# Patient Record
Sex: MALE | Race: WHITE | NOT HISPANIC OR LATINO | Employment: OTHER | URBAN - METROPOLITAN AREA
[De-identification: names, ages, dates, MRNs, and addresses within clinical notes are randomized per-mention and may not be internally consistent; named-entity substitution may affect disease eponyms.]

---

## 2021-04-06 ENCOUNTER — APPOINTMENT (EMERGENCY)
Dept: CT IMAGING | Facility: HOSPITAL | Age: 61
End: 2021-04-06
Payer: COMMERCIAL

## 2021-04-06 ENCOUNTER — HOSPITAL ENCOUNTER (EMERGENCY)
Facility: HOSPITAL | Age: 61
Discharge: HOME | End: 2021-04-06
Attending: EMERGENCY MEDICINE | Admitting: EMERGENCY MEDICINE
Payer: COMMERCIAL

## 2021-04-06 VITALS
HEIGHT: 71 IN | DIASTOLIC BLOOD PRESSURE: 53 MMHG | TEMPERATURE: 97.4 F | WEIGHT: 150 LBS | HEART RATE: 104 BPM | SYSTOLIC BLOOD PRESSURE: 93 MMHG | OXYGEN SATURATION: 99 % | BODY MASS INDEX: 21 KG/M2 | RESPIRATION RATE: 16 BRPM

## 2021-04-06 DIAGNOSIS — R55 SYNCOPE: Primary | ICD-10-CM

## 2021-04-06 LAB
ALBUMIN SERPL BCP-MCNC: 4 G/DL (ref 3–5.2)
ALP SERPL-CCNC: 123 U/L (ref 43–122)
ALT SERPL W P-5'-P-CCNC: 5 U/L
ANION GAP SERPL CALCULATED.3IONS-SCNC: 17 MMOL/L (ref 5–14)
ANISOCYTOSIS BLD QL SMEAR: PRESENT
AST SERPL W P-5'-P-CCNC: 18 U/L (ref 17–59)
ATRIAL RATE: 106 BPM
BASOPHILS # BLD AUTO: 0.1 THOUSANDS/ΜL (ref 0–0.1)
BASOPHILS NFR BLD AUTO: 1 % (ref 0–1)
BILIRUB SERPL-MCNC: 0.8 MG/DL
BUN SERPL-MCNC: 13 MG/DL (ref 5–25)
CALCIUM SERPL-MCNC: 9.3 MG/DL (ref 8.4–10.2)
CHLORIDE SERPL-SCNC: 92 MMOL/L (ref 97–108)
CK SERPL-CCNC: 25 U/L (ref 55–170)
CO2 SERPL-SCNC: 23 MMOL/L (ref 22–30)
CREAT SERPL-MCNC: 0.73 MG/DL (ref 0.7–1.5)
D DIMER PPP FEU-MCNC: 0.93 UG/ML FEU
EOSINOPHIL # BLD AUTO: 0.2 THOUSAND/ΜL (ref 0–0.4)
EOSINOPHIL NFR BLD AUTO: 2 % (ref 0–6)
ERYTHROCYTE [DISTWIDTH] IN BLOOD BY AUTOMATED COUNT: 18.5 %
GFR SERPL CREATININE-BSD FRML MDRD: 101 ML/MIN/1.73SQ M
GLUCOSE SERPL-MCNC: 148 MG/DL (ref 70–99)
HCT VFR BLD AUTO: 35.8 % (ref 41–53)
HGB BLD-MCNC: 10.5 G/DL (ref 13.5–17.5)
LIPASE SERPL-CCNC: 25 U/L (ref 23–300)
LYMPHOCYTES # BLD AUTO: 1 THOUSANDS/ΜL (ref 0.5–4)
LYMPHOCYTES NFR BLD AUTO: 12 % (ref 25–45)
MCH RBC QN AUTO: 19.2 PG (ref 26–34)
MCHC RBC AUTO-ENTMCNC: 29.4 G/DL (ref 31–36)
MCV RBC AUTO: 65 FL (ref 80–100)
MICROCYTES BLD QL AUTO: PRESENT
MONOCYTES # BLD AUTO: 0.5 THOUSAND/ΜL (ref 0.2–0.9)
MONOCYTES NFR BLD AUTO: 6 % (ref 1–10)
NEUTROPHILS # BLD AUTO: 6.2 THOUSANDS/ΜL (ref 1.8–7.8)
NEUTS SEG NFR BLD AUTO: 78 % (ref 45–65)
NT-PROBNP SERPL-MCNC: 66.6 PG/ML (ref 0–299)
PLATELET # BLD AUTO: 312 THOUSANDS/UL (ref 150–450)
PLATELET BLD QL SMEAR: ADEQUATE
PMV BLD AUTO: 9.3 FL (ref 8.9–12.7)
POIKILOCYTOSIS BLD QL SMEAR: PRESENT
POTASSIUM SERPL-SCNC: 4.1 MMOL/L (ref 3.6–5)
PR INTERVAL: 134 MS
PROT SERPL-MCNC: 7.9 G/DL (ref 5.9–8.4)
QRS AXIS: 148 DEGREES
QRSD INTERVAL: 86 MS
QT INTERVAL: 350 MS
QTC INTERVAL: 464 MS
RBC # BLD AUTO: 5.48 MILLION/UL (ref 4.5–5.9)
RBC MORPH BLD: NORMAL
SODIUM SERPL-SCNC: 132 MMOL/L (ref 137–147)
T WAVE AXIS: 150 DEGREES
TROPONIN I SERPL-MCNC: <0.01 NG/ML (ref 0–0.03)
VENTRICULAR RATE: 106 BPM
WBC # BLD AUTO: 7.9 THOUSAND/UL (ref 4.5–11)

## 2021-04-06 PROCEDURE — 82550 ASSAY OF CK (CPK): CPT | Performed by: PHYSICIAN ASSISTANT

## 2021-04-06 PROCEDURE — 96374 THER/PROPH/DIAG INJ IV PUSH: CPT

## 2021-04-06 PROCEDURE — 36415 COLL VENOUS BLD VENIPUNCTURE: CPT | Performed by: PHYSICIAN ASSISTANT

## 2021-04-06 PROCEDURE — 83880 ASSAY OF NATRIURETIC PEPTIDE: CPT | Performed by: PHYSICIAN ASSISTANT

## 2021-04-06 PROCEDURE — 99284 EMERGENCY DEPT VISIT MOD MDM: CPT

## 2021-04-06 PROCEDURE — 93005 ELECTROCARDIOGRAM TRACING: CPT

## 2021-04-06 PROCEDURE — 80053 COMPREHEN METABOLIC PANEL: CPT | Performed by: PHYSICIAN ASSISTANT

## 2021-04-06 PROCEDURE — 85025 COMPLETE CBC W/AUTO DIFF WBC: CPT | Performed by: PHYSICIAN ASSISTANT

## 2021-04-06 PROCEDURE — 96361 HYDRATE IV INFUSION ADD-ON: CPT

## 2021-04-06 PROCEDURE — 93010 ELECTROCARDIOGRAM REPORT: CPT | Performed by: INTERNAL MEDICINE

## 2021-04-06 PROCEDURE — 83690 ASSAY OF LIPASE: CPT | Performed by: PHYSICIAN ASSISTANT

## 2021-04-06 PROCEDURE — 84484 ASSAY OF TROPONIN QUANT: CPT | Performed by: PHYSICIAN ASSISTANT

## 2021-04-06 PROCEDURE — 70450 CT HEAD/BRAIN W/O DYE: CPT

## 2021-04-06 PROCEDURE — 85379 FIBRIN DEGRADATION QUANT: CPT | Performed by: PHYSICIAN ASSISTANT

## 2021-04-06 PROCEDURE — 71275 CT ANGIOGRAPHY CHEST: CPT

## 2021-04-06 PROCEDURE — 99284 EMERGENCY DEPT VISIT MOD MDM: CPT | Performed by: PHYSICIAN ASSISTANT

## 2021-04-06 RX ORDER — ONDANSETRON 2 MG/ML
4 INJECTION INTRAMUSCULAR; INTRAVENOUS ONCE
Status: COMPLETED | OUTPATIENT
Start: 2021-04-06 | End: 2021-04-06

## 2021-04-06 RX ADMIN — ONDANSETRON 4 MG: 2 INJECTION INTRAMUSCULAR; INTRAVENOUS at 18:35

## 2021-04-06 RX ADMIN — IOHEXOL 100 ML: 350 INJECTION, SOLUTION INTRAVENOUS at 19:01

## 2021-04-06 RX ADMIN — SODIUM CHLORIDE 1000 ML: 0.9 INJECTION, SOLUTION INTRAVENOUS at 18:37

## 2021-04-06 NOTE — ED PROVIDER NOTES
History  Chief Complaint   Patient presents with   Luisito Baumann when getting up to answer the door at home  Reports some dizziness prior to fall     Patient is a 45-year-old male presents today for evaluation of a syncopal episode  Patient was at home alone and reports he got up to answer the doorbell for his neighbor who was coming to check on him and reports he lost consciousness and fell striking his head  Patient denies any preceding symptoms reports symptoms currently denies any recent illness, changes to medications  Patient denies any chest pain, shortness of breath, palpitations prior to or after the episode  History provided by:  Patient   used: No    Syncope  Episode history:  Single  Most recent episode: Today  Duration:  15 seconds (witnessed)  Timing:  Constant  Progression:  Resolved  Chronicity:  New  Context: standing up ( quickly getting up to answer the door)    Witnessed: yes    Relieved by: spontaneous  Worsened by:  Nothing  Ineffective treatments:  None tried  Associated symptoms: no chest pain, no dizziness, no fever, no nausea, no palpitations, no shortness of breath and no vomiting        None       Past Medical History:   Diagnosis Date    Diabetes mellitus ([facility] [address] )        History reviewed  No pertinent surgical history  History reviewed  No pertinent family history  I have reviewed and agree with the history as documented  E-Cigarette/Vaping     E-Cigarette/Vaping Substances     Social History     Tobacco Use    Smoking status: Never Smoker    Smokeless tobacco: Never Used   Substance Use Topics    Alcohol use: Not Currently    Drug use: Never       Review of Systems   Constitutional: Negative for chills, fatigue and fever  HENT: Negative for congestion, ear pain, rhinorrhea and sore throat  Eyes: Negative for redness  Respiratory: Negative for chest tightness and shortness of breath  Cardiovascular: Positive for syncope   Negative for chest pain and palpitations  Gastrointestinal: Negative for abdominal pain, nausea and vomiting  Genitourinary: Negative for dysuria and hematuria  Musculoskeletal: Negative  Skin: Positive for wound ( b/l lower leg wounds)  Negative for rash  Neurological: Positive for syncope  Negative for dizziness, light-headedness and numbness  Physical Exam  Physical Exam  Vitals signs and nursing note reviewed  Constitutional:       Appearance: Normal appearance  He is well-developed  HENT:      Head: Normocephalic and atraumatic  Eyes:      General: No scleral icterus  Pupils: Pupils are equal, round, and reactive to light  Neck:      Musculoskeletal: Normal range of motion  Cardiovascular:      Rate and Rhythm: Normal rate and regular rhythm  Pulses: Normal pulses  Pulmonary:      Effort: Pulmonary effort is normal  No respiratory distress  Breath sounds: No stridor  Abdominal:      General: There is no distension  Palpations: There is no mass  Musculoskeletal:        Back:         Legs:    Skin:     General: Skin is warm and dry  Capillary Refill: Capillary refill takes less than 2 seconds  Coloration: Skin is pale  Skin is not jaundiced  Neurological:      General: No focal deficit present  Mental Status: He is alert and oriented to person, place, and time  Gait: Gait normal       Comments: GCS 15  AAOx4  No focal neuro deficits  CN II-XII intact  PERRL  EOMI  No pronator drift   strength 5/5 bilaterally  B/L UE strength 5/5 throughout  Finger to nose, heel shin, rapid alternating movements Cerebellar function normal  Ambulates without difficulty  B/L LE strength 5/5 throughout   Gross sensation to b/l upper and lower extremities intact        Psychiatric:         Mood and Affect: Mood normal          Vital Signs  ED Triage Vitals [04/06/21 1800]   Temperature Pulse Respirations Blood Pressure SpO2   (!) 97 4 °F (36 3 °C) 104 16 93/53 99 % Temp Source Heart Rate Source Patient Position - Orthostatic VS BP Location FiO2 (%)   Tympanic Monitor Sitting Left arm --      Pain Score       --           Vitals:    04/06/21 1800   BP: 93/53   Pulse: 104   Patient Position - Orthostatic VS: Sitting         Visual Acuity      ED Medications  Medications   sodium chloride 0 9 % bolus 1,000 mL (0 mL Intravenous Stopped 4/6/21 2017)   ondansetron (ZOFRAN) injection 4 mg (4 mg Intravenous Given 4/6/21 1835)   iohexol (OMNIPAQUE) 350 MG/ML injection (SINGLE-DOSE) 100 mL (100 mL Intravenous Given 4/6/21 1901)       Diagnostic Studies  Results Reviewed     Procedure Component Value Units Date/Time    Smear Review(Phlebs Do Not Order) [[id number]] Collected: 04/06/21 1829    Lab Status: Final result Specimen: Blood from Arm, Left Updated: 04/06/21 1910     RBC Morphology abnormal     Anisocytosis Present     Microcytes Present     Poikilocytes Present     Platelet Estimate Adequate    NT-BNP PRO [[id number]]  (Normal) Collected: 04/06/21 1829    Lab Status: Final result Specimen: Blood from Arm, Right Updated: 04/06/21 1858     NT-proBNP 66 6 pg/mL     Troponin I [[id number]]  (Normal) Collected: 04/06/21 1833    Lab Status: Final result Specimen: Blood from Arm, Left Updated: 04/06/21 1858     Troponin I <0 01 ng/mL     D-Dimer [[id number]]  (Abnormal) Collected: 04/06/21 1829    Lab Status: Final result Specimen: Blood from Arm, Right Updated: 04/06/21 1850     D-Dimer, Quant 0 93 ug/ml FEU     Lipase [[id number]]  (Normal) Collected: 04/06/21 1829    Lab Status: Final result Specimen: Blood from Arm, Right Updated: 04/06/21 1847     Lipase 25 u/L     CK Total with Reflex CKMB [[id number]]  (Abnormal) Collected: 04/06/21 1829    Lab Status: Final result Specimen: Blood from Arm, Right Updated: 04/06/21 1847     Total CK 25 U/L     Comprehensive metabolic panel [[id number]]  (Abnormal) Collected: 04/06/21 1829    Lab Status: Final result Specimen: Blood from Arm, Right Updated: 04/06/21 1847     Sodium 132 mmol/L      Potassium 4 1 mmol/L      Chloride 92 mmol/L      CO2 23 mmol/L      ANION GAP 17 mmol/L      BUN 13 mg/dL      Creatinine 0 73 mg/dL      Glucose 148 mg/dL      Calcium 9 3 mg/dL      AST 18 U/L      ALT 5 U/L      Alkaline Phosphatase 123 U/L      Total Protein 7 9 g/dL      Albumin 4 0 g/dL      Total Bilirubin 0 80 mg/dL      eGFR 101 ml/min/1 73sq m     Narrative:      National Kidney Disease Foundation guidelines for Chronic Kidney Disease (CKD):     Stage 1 with normal or high GFR (GFR > 90 mL/min/1 73 square meters)    Stage 2 Mild CKD (GFR = 60-89 mL/min/1 73 square meters)    Stage 3A Moderate CKD (GFR = 45-59 mL/min/1 73 square meters)    Stage 3B Moderate CKD (GFR = 30-44 mL/min/1 73 square meters)    Stage 4 Severe CKD (GFR = 15-29 mL/min/1 73 square meters)    Stage 5 End Stage CKD (GFR <15 mL/min/1 73 square meters)  Note: GFR calculation is accurate only with a steady state creatinine    CBC and differential [[id number]]  (Abnormal) Collected: 04/06/21 1829    Lab Status: Final result Specimen: Blood from Arm, Left Updated: 04/06/21 1844     WBC 7 90 Thousand/uL      RBC 5 48 Million/uL      Hemoglobin 10 5 g/dL      Hematocrit 35 8 %      MCV 65 fL      MCH 19 2 pg      MCHC 29 4 g/dL      RDW 18 5 %      MPV 9 3 fL      Platelets 747 Thousands/uL      Neutrophils Relative 78 %      Lymphocytes Relative 12 %      Monocytes Relative 6 %      Eosinophils Relative 2 %      Basophils Relative 1 %      Neutrophils Absolute 6 20 Thousands/µL      Lymphocytes Absolute 1 00 Thousands/µL      Monocytes Absolute 0 50 Thousand/µL      Eosinophils Absolute 0 20 Thousand/µL      Basophils Absolute 0 10 Thousands/µL     UA (URINE) with reflex to Scope [[id number]]     Lab Status: No result Specimen: Urine                  CTA ED chest PE Study   Final Result by Binh Miranda MD (04/06 1919)      1  No pulmonary embolus        Measured RV/LV ratio is within normal limits at less than 0 9  2 Less than 3 mm nodules on the right  Based on current Fleischner Society 2017 Guidelines on incidental pulmonary nodule, no routine follow-up is needed if the patient is considered low risk for lung cancer  If the patient is considered high risk for    lung cancer, 12 month follow-up non-contrast chest CT is recommended  Workstation performed: [id number]         CT head without contrast   Final Result by Carla Meza MD (04/06 1907)      No acute intracranial abnormality  Workstation performed: [id number]                    Procedures  ECG 12 Lead Documentation Only    Date/Time: 4/6/2021 6:19 PM  Performed by: Felicia Pan PA-C  Authorized by: Felicia Pan PA-C     Indications / Diagnosis:  Syncope  Patient location:  ED  Rate:     ECG rate:  106    ECG rate assessment: tachycardic    Rhythm:     Rhythm: sinus tachycardia    Ectopy:     Ectopy: none    QRS:     QRS axis:  Normal    QRS intervals:  Normal  Conduction:     Conduction: normal    ST segments:     ST segments:  Normal  T waves:     T waves: normal    Q waves:     Q waves:  V1 and V2  Comments:      JONATHAN 583             ED Course  ED Course as of Apr 06 2019   Tue Apr 06, 2021   1940    1  No pulmonary embolus      Measured RV/LV ratio is within normal limits at less than 0 9        2 Less than 3 mm nodules on the right  Based on current Fleischner Society 2017 Guidelines on incidental pulmonary nodule, no routine follow-up is needed if the patient is considered low risk for lung cancer  If the patient is considered high risk for   lung cancer, 12 month follow-up non-contrast chest CT is recommended  1944 Patient was reexamined at this time and informed of laboratory and/or imaging results and was found to be stable for discharge    Return to emergency department criteria was reviewed with the patient who verbalized understanding and was agreeable to discharge and the treatment plan at this time  SBIRT 20yo+      Most Recent Value   SBIRT (24 yo +)   In order to provide better care to our patients, we are screening all of our patients for alcohol and drug use  Would it be okay to ask you these screening questions? Unable to answer at this time Filed at: 04/06/2021 1817                    [facility]  Number of Diagnoses or Management Options  Syncope:   Diagnosis management comments: All imaging and/or lab testing discussed with patient, strict return to ED precautions discussed  Patient recommended to follow up promptly with appropriate outpatient provider  Patient and/or family members verbalizes understanding and agrees with plan  Patient is stable for discharge      Portions of the record may have been created with voice recognition software  Occasional wrong word or "sound a like" substitutions may have occurred due to the inherent limitations of voice recognition software  Read the chart carefully and recognize, using context, where substitutions have occurred  Disposition  Final diagnoses:   Syncope     Time reflects when diagnosis was documented in both MDM as applicable and the Disposition within this note     Time User Action Codes Description Comment    4/6/2021  7:58 PM Orin Alvarez Keli [R55] Syncope       ED Disposition     ED Disposition Condition Date/Time Comment    Discharge Good linda Apr 6, 2021  7:57 PM Alexei Godfrey discharge to home/self care  Follow-up Information     Follow up With Specialties Details Why Contact Info    Maggie Morgan MD Family Medicine Schedule an appointment as soon as possible for a visit in 2 days  [address]  [phone]            Patient's Medications    No medications on file     No discharge procedures on file      PDMP Review     None          ED Provider  Electronically Signed by           Daisha Potter PA-C  04/06/21 2019

## 2021-04-28 ENCOUNTER — TRANSCRIBE ORDERS (OUTPATIENT)
Dept: ADMINISTRATIVE | Facility: HOSPITAL | Age: 61
End: 2021-04-28

## 2021-04-28 ENCOUNTER — LAB (OUTPATIENT)
Dept: LAB | Facility: HOSPITAL | Age: 61
End: 2021-04-28
Payer: COMMERCIAL

## 2021-04-28 DIAGNOSIS — Z12.5 SPECIAL SCREENING FOR MALIGNANT NEOPLASM OF PROSTATE: ICD-10-CM

## 2021-04-28 DIAGNOSIS — R53.83 FATIGUE, UNSPECIFIED TYPE: ICD-10-CM

## 2021-04-28 DIAGNOSIS — Z00.00 ROUTINE ADULT HEALTH MAINTENANCE: ICD-10-CM

## 2021-04-28 DIAGNOSIS — R73.9 HYPERGLYCEMIA: ICD-10-CM

## 2021-04-28 DIAGNOSIS — R00.1 DECREASED HEART RATE: Primary | ICD-10-CM

## 2021-04-28 DIAGNOSIS — D64.9 ANEMIA, UNSPECIFIED TYPE: ICD-10-CM

## 2021-04-28 LAB
25(OH)D3 SERPL-MCNC: 11.3 NG/ML (ref 30–100)
ANISOCYTOSIS BLD QL SMEAR: PRESENT
BASOPHILS # BLD AUTO: 0.1 THOUSANDS/ΜL (ref 0–0.1)
BASOPHILS NFR BLD AUTO: 1 % (ref 0–1)
CHOLEST SERPL-MCNC: 196 MG/DL
EOSINOPHIL # BLD AUTO: 0.2 THOUSAND/ΜL (ref 0–0.4)
EOSINOPHIL NFR BLD AUTO: 2 % (ref 0–6)
ERYTHROCYTE [DISTWIDTH] IN BLOOD BY AUTOMATED COUNT: 20.3 %
EST. AVERAGE GLUCOSE BLD GHB EST-MCNC: 321 MG/DL
FERRITIN SERPL-MCNC: 9 NG/ML (ref 8–388)
FOLATE SERPL-MCNC: 7.7 NG/ML (ref 3.1–17.5)
HBA1C MFR BLD: 12.8 %
HCT VFR BLD AUTO: 33 % (ref 41–53)
HDLC SERPL-MCNC: 54 MG/DL
HGB BLD-MCNC: 10 G/DL (ref 13.5–17.5)
HYPERCHROMIA BLD QL SMEAR: PRESENT
IRON SATN MFR SERPL: 4 %
IRON SERPL-MCNC: 19 UG/DL (ref 65–175)
LDLC SERPL CALC-MCNC: 113 MG/DL
LYMPHOCYTES # BLD AUTO: 1.4 THOUSANDS/ΜL (ref 0.5–4)
LYMPHOCYTES NFR BLD AUTO: 15 % (ref 25–45)
MCH RBC QN AUTO: 20.2 PG (ref 26–34)
MCHC RBC AUTO-ENTMCNC: 30.3 G/DL (ref 31–36)
MCV RBC AUTO: 67 FL (ref 80–100)
MICROCYTES BLD QL AUTO: PRESENT
MONOCYTES # BLD AUTO: 0.5 THOUSAND/ΜL (ref 0.2–0.9)
MONOCYTES NFR BLD AUTO: 5 % (ref 1–10)
NEUTROPHILS # BLD AUTO: 7.1 THOUSANDS/ΜL (ref 1.8–7.8)
NEUTS SEG NFR BLD AUTO: 77 % (ref 45–65)
NONHDLC SERPL-MCNC: 142 MG/DL
PLATELET # BLD AUTO: 289 THOUSANDS/UL (ref 150–450)
PLATELET BLD QL SMEAR: ADEQUATE
PMV BLD AUTO: 9.1 FL (ref 8.9–12.7)
POIKILOCYTOSIS BLD QL SMEAR: PRESENT
PSA SERPL-MCNC: 0.2 NG/ML (ref 0–4)
RBC # BLD AUTO: 4.95 MILLION/UL (ref 4.5–5.9)
RBC MORPH BLD: NORMAL
TIBC SERPL-MCNC: 438 UG/DL (ref 250–450)
TRIGL SERPL-MCNC: 143 MG/DL
TSH SERPL DL<=0.05 MIU/L-ACNC: 1.08 UIU/ML (ref 0.47–4.68)
VIT B12 SERPL-MCNC: 574 PG/ML (ref 100–900)
WBC # BLD AUTO: 9.2 THOUSAND/UL (ref 4.5–11)

## 2021-04-28 PROCEDURE — 84443 ASSAY THYROID STIM HORMONE: CPT

## 2021-04-28 PROCEDURE — 85025 COMPLETE CBC W/AUTO DIFF WBC: CPT

## 2021-04-28 PROCEDURE — 82306 VITAMIN D 25 HYDROXY: CPT

## 2021-04-28 PROCEDURE — 83550 IRON BINDING TEST: CPT

## 2021-04-28 PROCEDURE — 82607 VITAMIN B-12: CPT

## 2021-04-28 PROCEDURE — 82728 ASSAY OF FERRITIN: CPT

## 2021-04-28 PROCEDURE — 82746 ASSAY OF FOLIC ACID SERUM: CPT

## 2021-04-28 PROCEDURE — G0103 PSA SCREENING: HCPCS

## 2021-04-28 PROCEDURE — 80061 LIPID PANEL: CPT

## 2021-04-28 PROCEDURE — 83036 HEMOGLOBIN GLYCOSYLATED A1C: CPT

## 2021-04-28 PROCEDURE — 36415 COLL VENOUS BLD VENIPUNCTURE: CPT

## 2021-04-28 PROCEDURE — 83540 ASSAY OF IRON: CPT

## 2021-05-03 ENCOUNTER — LAB (OUTPATIENT)
Dept: LAB | Facility: HOSPITAL | Age: 61
End: 2021-05-03
Payer: COMMERCIAL

## 2021-05-03 ENCOUNTER — HOSPITAL ENCOUNTER (OUTPATIENT)
Dept: NON INVASIVE DIAGNOSTICS | Facility: HOSPITAL | Age: 61
Discharge: HOME | End: 2021-05-03
Payer: COMMERCIAL

## 2021-05-03 DIAGNOSIS — Z12.11 SPECIAL SCREENING FOR MALIGNANT NEOPLASMS, COLON: ICD-10-CM

## 2021-05-03 DIAGNOSIS — R00.1 DECREASED HEART RATE: ICD-10-CM

## 2021-05-03 LAB
HEMOCCULT STL QL: NEGATIVE

## 2021-05-03 PROCEDURE — 93226 XTRNL ECG REC<48 HR SCAN A/R: CPT

## 2021-05-03 PROCEDURE — 93225 XTRNL ECG REC<48 HRS REC: CPT

## 2021-05-03 PROCEDURE — 82272 OCCULT BLD FECES 1-3 TESTS: CPT

## 2021-05-04 NOTE — RESULT ENCOUNTER NOTE
Please call the patient regarding his abnormal result  anemic, ferrous sulfate 325 # 180 twice  a day and colonoscopy  Sugar very high refer to endocrinologist  vit d low, vit d 45253 unit once a week #12 and 2 ref

## 2021-05-06 PROCEDURE — 93227 XTRNL ECG REC<48 HR R&I: CPT | Performed by: INTERNAL MEDICINE

## 2021-08-05 ENCOUNTER — APPOINTMENT (OUTPATIENT)
Dept: LAB | Facility: HOSPITAL | Age: 61
End: 2021-08-05
Payer: COMMERCIAL

## 2021-08-05 DIAGNOSIS — D64.9 ANEMIA, UNSPECIFIED TYPE: ICD-10-CM

## 2021-08-05 DIAGNOSIS — R19.7 DIARRHEA, UNSPECIFIED TYPE: ICD-10-CM

## 2021-08-05 PROCEDURE — 87177 OVA AND PARASITES SMEARS: CPT

## 2021-08-05 PROCEDURE — 87505 NFCT AGENT DETECTION GI: CPT

## 2021-08-05 PROCEDURE — 87205 SMEAR GRAM STAIN: CPT

## 2021-08-05 PROCEDURE — 87209 SMEAR COMPLEX STAIN: CPT

## 2021-08-06 LAB
CAMPYLOBACTER DNA SPEC NAA+PROBE: NORMAL
SALMONELLA DNA SPEC QL NAA+PROBE: NORMAL
SHIGA TOXIN STX GENE SPEC NAA+PROBE: NORMAL
SHIGELLA DNA SPEC QL NAA+PROBE: NORMAL
WBC STL QL MICRO: NORMAL

## 2021-08-11 ENCOUNTER — APPOINTMENT (OUTPATIENT)
Dept: LAB | Facility: HOSPITAL | Age: 61
End: 2021-08-11
Payer: COMMERCIAL

## 2021-08-11 ENCOUNTER — LAB (OUTPATIENT)
Dept: LAB | Facility: HOSPITAL | Age: 61
End: 2021-08-11
Payer: COMMERCIAL

## 2021-08-11 ENCOUNTER — OFFICE VISIT (OUTPATIENT)
Dept: WOUND CARE | Facility: HOSPITAL | Age: 61
End: 2021-08-11
Payer: COMMERCIAL

## 2021-08-11 VITALS
TEMPERATURE: 96.1 F | RESPIRATION RATE: 12 BRPM | DIASTOLIC BLOOD PRESSURE: 68 MMHG | HEART RATE: 84 BPM | SYSTOLIC BLOOD PRESSURE: 108 MMHG

## 2021-08-11 DIAGNOSIS — S21.201A OPEN WOUND OF RIGHT SIDE OF BACK, INITIAL ENCOUNTER: ICD-10-CM

## 2021-08-11 DIAGNOSIS — S81.801A OPEN WOUND OF RIGHT LOWER EXTREMITY WITHOUT COMPLICATION, INITIAL ENCOUNTER: Primary | ICD-10-CM

## 2021-08-11 DIAGNOSIS — S01.00XA OPEN WOUND OF SCALP, UNSPECIFIED OPEN WOUND TYPE, INITIAL ENCOUNTER: ICD-10-CM

## 2021-08-11 DIAGNOSIS — S81.001A OPEN WOUND OF RIGHT KNEE, INITIAL ENCOUNTER: ICD-10-CM

## 2021-08-11 DIAGNOSIS — R63.4 WEIGHT LOSS, UNINTENTIONAL: ICD-10-CM

## 2021-08-11 DIAGNOSIS — D64.9 ANEMIA, UNSPECIFIED TYPE: ICD-10-CM

## 2021-08-11 DIAGNOSIS — E11.69 TYPE 2 DIABETES MELLITUS WITH OTHER SPECIFIED COMPLICATION, WITHOUT LONG-TERM CURRENT USE OF INSULIN (HCC): ICD-10-CM

## 2021-08-11 DIAGNOSIS — E11.9 CONTROLLED TYPE 2 DIABETES MELLITUS WITHOUT COMPLICATION, WITHOUT LONG-TERM CURRENT USE OF INSULIN (HCC): ICD-10-CM

## 2021-08-11 LAB
ALBUMIN SERPL BCP-MCNC: 3.5 G/DL (ref 3.5–5)
ALP SERPL-CCNC: 121 U/L (ref 46–116)
ALT SERPL W P-5'-P-CCNC: 21 U/L (ref 12–78)
ANION GAP SERPL CALCULATED.3IONS-SCNC: 9 MMOL/L (ref 4–13)
AST SERPL W P-5'-P-CCNC: 14 U/L (ref 5–45)
BASOPHILS # BLD AUTO: 0.11 THOUSANDS/ΜL (ref 0–0.1)
BASOPHILS NFR BLD AUTO: 1 % (ref 0–1)
BILIRUB SERPL-MCNC: 0.31 MG/DL (ref 0.2–1)
BUN SERPL-MCNC: 13 MG/DL (ref 5–25)
C DIFF TOX B TCDB STL QL NAA+PROBE: NEGATIVE
CALCIUM SERPL-MCNC: 9 MG/DL (ref 8.3–10.1)
CHLORIDE SERPL-SCNC: 97 MMOL/L (ref 100–108)
CHOLEST SERPL-MCNC: 176 MG/DL (ref 50–200)
CO2 SERPL-SCNC: 28 MMOL/L (ref 21–32)
CREAT SERPL-MCNC: 0.63 MG/DL (ref 0.6–1.3)
EOSINOPHIL # BLD AUTO: 0.23 THOUSAND/ΜL (ref 0–0.61)
EOSINOPHIL NFR BLD AUTO: 2 % (ref 0–6)
ERYTHROCYTE [DISTWIDTH] IN BLOOD BY AUTOMATED COUNT: 16.9 % (ref 11.6–15.1)
EST. AVERAGE GLUCOSE BLD GHB EST-MCNC: 301 MG/DL
FERRITIN SERPL-MCNC: 5 NG/ML (ref 8–388)
FOLATE SERPL-MCNC: 15.1 NG/ML (ref 3.1–17.5)
GFR SERPL CREATININE-BSD FRML MDRD: 107 ML/MIN/1.73SQ M
GLUCOSE P FAST SERPL-MCNC: 381 MG/DL (ref 65–99)
HBA1C MFR BLD: 12.1 %
HCT VFR BLD AUTO: 35.5 % (ref 36.5–49.3)
HDLC SERPL-MCNC: 68 MG/DL
HEMOCCULT STL QL: NEGATIVE
HGB BLD-MCNC: 10.2 G/DL (ref 12–17)
IMM GRANULOCYTES # BLD AUTO: 0.03 THOUSAND/UL (ref 0–0.2)
IMM GRANULOCYTES NFR BLD AUTO: 0 % (ref 0–2)
IRON SATN MFR SERPL: 4 %
IRON SERPL-MCNC: 18 UG/DL (ref 65–175)
LDLC SERPL CALC-MCNC: 95 MG/DL (ref 0–100)
LYMPHOCYTES # BLD AUTO: 1.74 THOUSANDS/ΜL (ref 0.6–4.47)
LYMPHOCYTES NFR BLD AUTO: 18 % (ref 14–44)
MCH RBC QN AUTO: 19.8 PG (ref 26.8–34.3)
MCHC RBC AUTO-ENTMCNC: 28.7 G/DL (ref 31.4–37.4)
MCV RBC AUTO: 69 FL (ref 82–98)
MONOCYTES # BLD AUTO: 0.66 THOUSAND/ΜL (ref 0.17–1.22)
MONOCYTES NFR BLD AUTO: 7 % (ref 4–12)
NEUTROPHILS # BLD AUTO: 7 THOUSANDS/ΜL (ref 1.85–7.62)
NEUTS SEG NFR BLD AUTO: 72 % (ref 43–75)
NONHDLC SERPL-MCNC: 108 MG/DL
NRBC BLD AUTO-RTO: 0 /100 WBCS
O+P STL CONC: NORMAL
PLATELET # BLD AUTO: 331 THOUSANDS/UL (ref 149–390)
PMV BLD AUTO: 10.7 FL (ref 8.9–12.7)
POTASSIUM SERPL-SCNC: 4.3 MMOL/L (ref 3.5–5.3)
PROT SERPL-MCNC: 7.6 G/DL (ref 6.4–8.2)
RBC # BLD AUTO: 5.16 MILLION/UL (ref 3.88–5.62)
SODIUM SERPL-SCNC: 134 MMOL/L (ref 136–145)
TIBC SERPL-MCNC: 478 UG/DL (ref 250–450)
TRIGL SERPL-MCNC: 65 MG/DL
VIT B12 SERPL-MCNC: 620 PG/ML (ref 100–900)
WBC # BLD AUTO: 9.77 THOUSAND/UL (ref 4.31–10.16)

## 2021-08-11 PROCEDURE — 85025 COMPLETE CBC W/AUTO DIFF WBC: CPT

## 2021-08-11 PROCEDURE — 87493 C DIFF AMPLIFIED PROBE: CPT

## 2021-08-11 PROCEDURE — 80053 COMPREHEN METABOLIC PANEL: CPT

## 2021-08-11 PROCEDURE — 80061 LIPID PANEL: CPT

## 2021-08-11 PROCEDURE — 83036 HEMOGLOBIN GLYCOSYLATED A1C: CPT

## 2021-08-11 PROCEDURE — 82607 VITAMIN B-12: CPT

## 2021-08-11 PROCEDURE — 99214 OFFICE O/P EST MOD 30 MIN: CPT | Performed by: FAMILY MEDICINE

## 2021-08-11 PROCEDURE — 83550 IRON BINDING TEST: CPT

## 2021-08-11 PROCEDURE — 82746 ASSAY OF FOLIC ACID SERUM: CPT

## 2021-08-11 PROCEDURE — 83540 ASSAY OF IRON: CPT

## 2021-08-11 PROCEDURE — 99203 OFFICE O/P NEW LOW 30 MIN: CPT | Performed by: FAMILY MEDICINE

## 2021-08-11 PROCEDURE — 36415 COLL VENOUS BLD VENIPUNCTURE: CPT

## 2021-08-11 PROCEDURE — 82728 ASSAY OF FERRITIN: CPT

## 2021-08-11 PROCEDURE — 82272 OCCULT BLD FECES 1-3 TESTS: CPT

## 2021-08-11 NOTE — PROGRESS NOTES
Patient ID: Mickey Álvarez is a 62 y o  male Date of Birth 1960       Chief Complaint   Patient presents with   [facility] Patient Visit     multiple wounds       Allergies:  Novocain [procaine]    Diagnosis:      Diagnosis ICD-10-CM Associated Orders   1  Open wound of right lower extremity without complication, initial encounter  S81 801A Wound cleansing and dressings     mupirocin (BACTROBAN) 2 % ointment   2  Open wound of right knee, initial encounter  S81 001A mupirocin (BACTROBAN) 2 % ointment   3  Open wound of scalp, unspecified open wound type, initial encounter  S01  00XA Wound cleansing and dressings     mupirocin (BACTROBAN) 2 % ointment   4  Open wound of right side of back, initial encounter  S21 201A Wound cleansing and dressings   5  Weight loss, unintentional  R63 4    6  Controlled type 2 diabetes mellitus without complication, without long-term current use of insulin (HCC)  E11 9            Assessment :   Multiple open wounds secondary to ongoing scratching of the right leg and back  No signs of infection  Traumatic wound of the right knee and scalp from fall  Again, the patient continues to irritate these due to scratching  Poor hygiene  Type 2 diabetes  Significant weight loss of unknown reason  History of bed bug infestation, supposedly treated by   It also appears that the patient is not taking the iron supplementation as ordered by his PCP and it is uncertain if he is taking is vitamin-D  Plan:   Extensive discussion with both the patient and his sister who accompanied him today  He must not irritate or scratch any wounds  We will use mupirocin ointment and bordered gauze daily to all wounds except for the back  Advised Daily multivitamin  He has already getting Meals on Wheels  He will discuss with his primary doctor at next visit  Subjective:   8/11/21:   This is a 60-year-old male referred by his primary care physician for multiple open wounds on the right lower extremity, scalp and back  Patient has his sister company him today  She states that he has had these wounds on his right leg and back for many months  The scalp and right knee is due to a fall more recently  The patient admits that he scratches his right leg frequently as confirmed by blood under his fingernails  The back wounds are constantly irritated from him rubbing against things  Apparently the patient has had bed bug infestation that was treated recently  The patient cannot stop scratching  States he has chronic diarrhea and has lost approximately 120 lb over the past years  He has seems primary care physician recently and blood work was obtained today  He will be having stool culture as well  The following portions of the patient's history were reviewed and updated as appropriate: There is no problem list on file for this patient  Past Medical History:   Diagnosis Date    [facility]      No past surgical history on file  No family history on file  Social History     Socioeconomic History    Marital status: Single     Spouse name: None    Number of children: None    Years of education: None    Highest education level: None   Occupational History    None   Tobacco Use    Smoking status: Never Smoker    Smokeless tobacco: Never Used   Substance and Sexual Activity    Alcohol use: Not Currently    Drug use: Never    Sexual activity: None   Other Topics Concern    None   Social History Narrative    None     Social Determinants of Health     Financial Resource Strain:     Difficulty of Paying Living Expenses:    Food Insecurity:     Worried About Running Out of Food in the Last Year:     Ran Out of Food in the Last Year:    Transportation Needs:     Lack of Transportation (Medical):      Lack of Transportation (Non-Medical):    Physical Activity:     Days of Exercise per Week:     Minutes of Exercise per Session:    Stress:     Feeling of Stress : Social Connections:     Frequency of Communication with Friends and Family:     Frequency of Social Gatherings with Friends and Family:     Attends Yazdanism Services:     Active Member of Clubs or Organizations:     Attends Club or Organization Meetings:     Marital Status:    Intimate Partner Violence:     Fear of Current or Ex-Partner:     Emotionally Abused:     Physically Abused:     Sexually Abused:         Current Outpatient Medications:     Continuous Blood Gluc  (FreeStyle Josiah 14 Day [place]) DUNIA, Use 1 each 4 (four) times a day, Disp: 1 each, Rfl: 0    Continuous Blood Gluc Sensor (FreeStyle Josiah 14 Day Sensor) MISC, Use 1 each 4 (four) times a day, Disp: 6 each, Rfl: 3    Empagliflozin (Jardiance) 10 MG TABS, Take 1 tablet (10 mg total) by mouth every morning, Disp: 90 tablet, Rfl: 3    glucose blood (FREESTYLE LITE) test strip, Use as instructed, Disp: 300 each, Rfl: 4    glucose monitoring kit (FREESTYLE) monitoring kit, Use 1 each 2 (two) times a day, Disp: 1 each, Rfl: 0    Lancets (freestyle) lancets, Use as instructed, Disp: 300 each, Rfl: 4    ergocalciferol (VITAMIN D2) 50,000 units, Take 1 capsule (50,000 Units total) by mouth once a week (Patient not taking: Reported on 8/11/2021), Disp: 12 capsule, Rfl: 3    ferrous sulfate 325 (65 Fe) mg tablet, Take 1 tablet (325 mg total) by mouth 2 (two) times a day with meals (Patient not taking: Reported on 8/11/2021), Disp: 180 tablet, Rfl: 3    mupirocin (BACTROBAN) 2 % ointment, Apply topically daily, Disp: 22 g, Rfl: 1    sitaGLIPtin-metFORMIN (JANUMET)  MG per tablet, Take 1 tablet by mouth 2 (two) times a day with meals (Patient not taking: Reported on 8/11/2021), Disp: 180 tablet, Rfl: 3    Review of Systems   Constitutional: Positive for unexpected weight change (250 - 130 in 1 year)  Negative for appetite change, chills, fatigue and fever     HENT: Negative for congestion, hearing loss, postnasal drip and sinus pressure  Eyes: Negative for discharge and visual disturbance  Respiratory: Negative for cough and shortness of breath  Cardiovascular: Positive for chest pain (Occasionally without exertion)  Negative for palpitations and leg swelling  Gastrointestinal: Positive for diarrhea (Chronic)  Negative for abdominal pain, blood in stool, constipation and nausea  Endocrine: Negative  Genitourinary: Negative for difficulty urinating, dysuria and urgency  Musculoskeletal: Negative for back pain and gait problem  Skin: Positive for wound (Right lower extremity, right knee, right upper back and midline back  )  Negative for rash  Allergic/Immunologic: Negative  Neurological: Negative for dizziness, tremors, seizures, weakness, numbness and headaches  Hematological: Does not bruise/bleed easily  Psychiatric/Behavioral: Negative  Negative for dysphoric mood  The patient is not nervous/anxious  Objective:  /68   Pulse 84   Temp (!) 96 1 °F (35 6 °C)   Resp 12   Pain Score: 0-No pain     Physical Exam  Vitals and nursing note reviewed  Constitutional:       Appearance: Normal appearance  He is well-developed and normal weight  HENT:      Head: Normocephalic and atraumatic  Right Ear: External ear normal       Left Ear: External ear normal       Mouth/Throat:      Mouth: Mucous membranes are moist       Dentition: Abnormal dentition  Dental caries present  Eyes:      General: Lids are normal          Right eye: No discharge  Left eye: No discharge  Conjunctiva/sclera: Conjunctivae normal       Pupils: Pupils are equal, round, and reactive to light  Cardiovascular:      Rate and Rhythm: Normal rate and regular rhythm  Heart sounds: Normal heart sounds  No murmur heard  No friction rub  No gallop  Pulmonary:      Effort: Pulmonary effort is normal       Breath sounds: Normal breath sounds and air entry  Abdominal:      General: Abdomen is flat  Palpations: Abdomen is soft  There is no hepatomegaly or splenomegaly  Tenderness: There is no abdominal tenderness  There is no guarding or rebound  Musculoskeletal:      Cervical back: Neck supple  Right lower leg: No edema  Left lower leg: No edema  Lymphadenopathy:      Cervical: No cervical adenopathy  Skin:     General: Skin is warm and dry  Findings: Wound present  Comments: Right upper back wound with dry, thin eschar  No signs of infection  Large full-thickness wound on the right shin  Edges with dry blood  Base is granular in clean  Minimal surrounding erythema  Small drainage  Open wound of the right knee with granulation tissue and some eschar  Open wound of the scalp secondary to trauma  Some eschar and debris  No signs of infection  Neurological:      Mental Status: He is alert  Gait: Gait is intact  Psychiatric:         Attention and Perception: Attention normal          Mood and Affect: Mood and affect normal          Speech: Speech normal          Behavior: Behavior is cooperative  Comments: Difficult to assess for cognitive shin and memory  Patient appears to be somewhat slow  Sister does most of the talking and answering questions  Wound Instructions:  Orders Placed This Encounter   Procedures    Wound cleansing and dressings     For Wounds on Right Shin and Right Knee:  Wash your hands with soap and water  Remove old dressing, discard into plastic bag and place in trash  Cleanse the wound with mild soap  (such as Dove) and water prior to applying a clean dressing  Do not use tissue or cotton balls  Do not scrub the wound  Pat dry using gauze  Shower yes   Apply mupricin ointment to the wound  Cover with dry sterile dressing (bordered gauze dressing)  Change dressing daily    For Head wound:  Wash your hands with soap and water  Remove old dressing, discard into plastic bag and place in trash    Cleanse the wound with mild soap/shampoo prior to applying a clean dressing  Do not use tissue or cotton balls  Do not scrub the wound  Pat dry using gauze  Shower yes   Apply mupricin ointment to the head wound  You may keep open with no dressing  Keep wound moist with the mupricin ointment  For back wounds:  Wash your hands with soap and water  Remove old dressing, discard into plastic bag and place in trash  Cleanse the wound with mild soap and water prior to applying a clean dressing  Do not use tissue or cotton balls  Do not scrub the wound  Pat dry using gauze  Shower yes   Apply bordered gauze dressing  Come back in one weeks for follow up visit  Dr Abdirizak Peterson will order Mupricin ointment to your pharmacy  Please  this perscription for dressing changes  Take multivitamin every day (one-a-day mens or centrum silver) in addition to meals on wheels food  Ask your primary care physician about an iron supplement prescription  Please talk to your primary care physician about weight loss of over 100 lbs in the last year  Standing Status:   Future     Standing Expiration Date:   8/11/2022       Total time spent today:  30 minutes  This includes reviewing the patient's chart, pertinent physician records from primary care physician and laboratory data including recent lab work ordered today, reviewed x-ray, vitamin-D level, Iron studies and stool studies  Naheed Jackson MD, CHT, CWS    Portions of the record may have been created with voice recognition software  Occasional wrong word or "sound alike" substitutions may have occurred due to the inherent limitations of voice recognition software  Read the chart carefully and recognize, using context, where substitutions have occurred

## 2021-08-11 NOTE — PATIENT INSTRUCTIONS
Orders Placed This Encounter   Procedures    Wound cleansing and dressings     For Wounds on Right Shin and Right Knee:  Wash your hands with soap and water  Remove old dressing, discard into plastic bag and place in trash  Cleanse the wound with mild soap  (such as Dove) and water prior to applying a clean dressing  Do not use tissue or cotton balls  Do not scrub the wound  Pat dry using gauze  Shower yes   Apply mupricin ointment to the wound  Cover with dry sterile dressing (bordered gauze dressing)  Change dressing daily    For Head wound:  Wash your hands with soap and water  Remove old dressing, discard into plastic bag and place in trash  Cleanse the wound with mild soap/shampoo prior to applying a clean dressing  Do not use tissue or cotton balls  Do not scrub the wound  Pat dry using gauze  Shower yes   Apply mupricin ointment to the head wound  You may keep open with no dressing  Keep wound moist with the mupricin ointment  For back wounds:  Wash your hands with soap and water  Remove old dressing, discard into plastic bag and place in trash  Cleanse the wound with mild soap and water prior to applying a clean dressing  Do not use tissue or cotton balls  Do not scrub the wound  Pat dry using gauze  Shower yes   Apply bordered gauze dressing  Come back in one weeks for follow up visit  Dr Farhan Taylor will order Mupricin ointment to your pharmacy  Please  this perscription for dressing changes  Take multivitamin every day (one-a-day mens or centrum silver) in addition to meals on wheels food  Ask your primary care physician about an iron supplement prescription  Please talk to your primary care physician about weight loss of over 100 lbs in the last year       Standing Status:   Future     Standing Expiration Date:   8/11/2022

## 2021-08-14 NOTE — RESULT ENCOUNTER NOTE
Please call the patient regarding his abnormal result  Sugar high refer to endocrinologist   Anemic refer to hematologist

## 2021-08-19 ENCOUNTER — OFFICE VISIT (OUTPATIENT)
Dept: WOUND CARE | Facility: HOSPITAL | Age: 61
End: 2021-08-19
Payer: COMMERCIAL

## 2021-08-19 VITALS
DIASTOLIC BLOOD PRESSURE: 68 MMHG | SYSTOLIC BLOOD PRESSURE: 104 MMHG | HEART RATE: 64 BPM | TEMPERATURE: 97.1 F | RESPIRATION RATE: 16 BRPM

## 2021-08-19 DIAGNOSIS — S81.001A OPEN WOUND OF RIGHT KNEE, INITIAL ENCOUNTER: ICD-10-CM

## 2021-08-19 DIAGNOSIS — S01.00XA OPEN WOUND OF SCALP, UNSPECIFIED OPEN WOUND TYPE, INITIAL ENCOUNTER: ICD-10-CM

## 2021-08-19 DIAGNOSIS — S81.801A OPEN WOUND OF RIGHT LOWER EXTREMITY WITHOUT COMPLICATION, INITIAL ENCOUNTER: Primary | ICD-10-CM

## 2021-08-19 DIAGNOSIS — S21.201A OPEN WOUND OF RIGHT SIDE OF BACK, INITIAL ENCOUNTER: ICD-10-CM

## 2021-08-19 PROCEDURE — 99213 OFFICE O/P EST LOW 20 MIN: CPT | Performed by: FAMILY MEDICINE

## 2021-08-19 RX ORDER — LIDOCAINE 40 MG/G
CREAM TOPICAL ONCE
Status: COMPLETED | OUTPATIENT
Start: 2021-08-19 | End: 2021-08-19

## 2021-08-19 RX ADMIN — LIDOCAINE: 40 CREAM TOPICAL at 14:28

## 2021-08-19 NOTE — PROGRESS NOTES
well     8/19/21:  Followup multiple traumatic wounds of the right leg, scalp, right knee and back  Patient has no complaints  Sister states that she has seen some blood on the toilet seat  She wonders whether there is something going on on the buttocks  The patient denies any problems  The following portions of the patient's history were reviewed and updated as appropriate: There is no problem list on file for this patient  Past Medical History:   Diagnosis Date    Diabetes mellitus ([facility] [address] )      No past surgical history on file  No family history on file  Social History     Socioeconomic History    Marital status: Single     Spouse name: None    Number of children: None    Years of education: None    Highest education level: None   Occupational History    None   Tobacco Use    Smoking status: Never Smoker    Smokeless tobacco: Never Used   Substance and Sexual Activity    Alcohol use: Not Currently    Drug use: Never    Sexual activity: None   Other Topics Concern    None   Social History Narrative    None     Social Determinants of Health     Financial Resource Strain:     Difficulty of Paying Living Expenses:    Food Insecurity:     Worried About Running Out of Food in the Last Year:     Ran Out of Food in the Last Year:    Transportation Needs:     Lack of Transportation (Medical):      Lack of Transportation (Non-Medical):    Physical Activity:     Days of Exercise per Week:     Minutes of Exercise per Session:    Stress:     Feeling of Stress :    Social Connections:     Frequency of Communication with Friends and Family:     Frequency of Social Gatherings with Friends and Family:     Attends Confucianist Services:     Active Member of Clubs or Organizations:     Attends Club or Organization Meetings:     Marital Status:    Intimate Partner Violence:     Fear of Current or Ex-Partner:     Emotionally Abused:     Physically Abused:     Sexually Abused:        Current Outpatient Medications:     Continuous Blood Gluc  (FreeStyle Josiah 14 Day [place]) DUNIA, Use 1 each 4 (four) times a day, Disp: 1 each, Rfl: 0    Continuous Blood Gluc Sensor (FreeStyle Josiah 14 Day Sensor) MISC, Use 1 each 4 (four) times a day, Disp: 6 each, Rfl: 3    Empagliflozin (Jardiance) 10 MG TABS, Take 1 tablet (10 mg total) by mouth every morning, Disp: 90 tablet, Rfl: 3    ergocalciferol (VITAMIN D2) 50,000 units, Take 1 capsule (50,000 Units total) by mouth once a week (Patient not taking: Reported on 8/11/2021), Disp: 12 capsule, Rfl: 3    ferrous sulfate 325 (65 Fe) mg tablet, Take 1 tablet (325 mg total) by mouth 2 (two) times a day with meals (Patient not taking: Reported on 8/11/2021), Disp: 180 tablet, Rfl: 3    glucose blood (FREESTYLE LITE) test strip, Use as instructed, Disp: 300 each, Rfl: 4    glucose monitoring kit (FREESTYLE) monitoring kit, Use 1 each 2 (two) times a day, Disp: 1 each, Rfl: 0    Lancets (freestyle) lancets, Use as instructed, Disp: 300 each, Rfl: 4    mupirocin (BACTROBAN) 2 % ointment, Apply topically daily, Disp: 22 g, Rfl: 1    sitaGLIPtin-metFORMIN (JANUMET)  MG per tablet, Take 1 tablet by mouth 2 (two) times a day with meals (Patient not taking: Reported on 8/11/2021), Disp: 180 tablet, Rfl: 3  No current facility-administered medications for this visit  Review of Systems   Constitutional: Negative for appetite change, chills, fatigue, fever and unexpected weight change  HENT: Negative for congestion, hearing loss and postnasal drip  Respiratory: Negative for cough and shortness of breath  Cardiovascular: Negative for leg swelling  Gastrointestinal: Positive for diarrhea  Blood on toilet seat, ? etiology   Musculoskeletal: Negative for gait problem  Skin: Positive for wound (Multiple)  Negative for rash  Neurological: Negative for numbness  Hematological: Does not bruise/bleed easily           Objective:  /68 Pulse 64   Temp (!) 97 1 °F (36 2 °C)   Resp 16         Physical Exam  Vitals and nursing note reviewed  Constitutional:       Appearance: Normal appearance  He is well-developed and normal weight  HENT:      Head: Normocephalic and atraumatic  Pulmonary:      Effort: Pulmonary effort is normal    Skin:     General: Skin is warm and dry  Findings: Wound present  No erythema  Comments: All wounds have clean granular bases  The right lower leg wound has significant edge epithelialization is much smaller  The knee wound is improving  Scalp wound is improving  The back wound is closed  Neurological:      Mental Status: He is alert and oriented to person, place, and time  Motor: Weakness present  Psychiatric:         Attention and Perception: Attention normal          Mood and Affect: Mood and affect normal          Behavior: Behavior is cooperative  Cognition and Memory: Cognition is impaired  Wound 08/11/21 Traumatic Pretibial Right (Active)   Wound Image Images linked 08/19/21 1407   Wound Description Granulation tissue; Epithelialization 08/19/21 1407   Leticia-wound Assessment Scar Tissue 08/19/21 1407   Wound Length (cm) 2 cm 08/19/21 1407   Wound Width (cm) 1 3 cm 08/19/21 1407   Wound Depth (cm) 0 1 cm 08/19/21 1407   Wound Surface Area (cm^2) 2 6 cm^2 08/19/21 1407   Wound Volume (cm^3) 0 26 cm^3 08/19/21 1407   Calculated Wound Volume (cm^3) 0 26 cm^3 08/19/21 1407   Change in Wound Size % 83 12 08/19/21 1407   Drainage Amount Moderate 08/19/21 1407   Drainage Description Serosanguineous 08/19/21 1407   Non-staged Wound Description Full thickness 08/19/21 1407   Dressing Status Other (Comment) (open to air on arrival) 08/19/21 1407       Wound 08/11/21 Traumatic Knee Anterior;Right (Active)   Wound Image Images linked 08/19/21 1406   Wound Description Granulation tissue; Epithelialization 08/19/21 1406   Leticia-wound Assessment Intact 08/19/21 1406 Wound Length (cm) 0 2 cm 08/19/21 1406   Wound Width (cm) 0 3 cm 08/19/21 1406   Wound Depth (cm) 0 1 cm 08/19/21 1406   Wound Surface Area (cm^2) 0 06 cm^2 08/19/21 1406   Wound Volume (cm^3) 0 006 cm^3 08/19/21 1406   Calculated Wound Volume (cm^3) 0 01 cm^3 08/19/21 1406   Change in Wound Size % 90 08/19/21 1406   Drainage Amount Small 08/19/21 1406   Drainage Description Serous 08/19/21 1406   Non-staged Wound Description Full thickness 08/19/21 1406       Wound 08/11/21 Traumatic Face Superior (Active)   Wound Image Images linked 08/19/21 1405   Wound Description Pink 08/19/21 1405   Leticia-wound Assessment [place] 08/19/21 1405   Wound Length (cm) 3 4 cm 08/19/21 1405   Wound Width (cm) 1 5 cm 08/19/21 1405   Wound Depth (cm) 0 1 cm 08/19/21 1405   Wound Surface Area (cm^2) 5 1 cm^2 08/19/21 1405   Wound Volume (cm^3) 0 51 cm^3 08/19/21 1405   Calculated Wound Volume (cm^3) 0 51 cm^3 08/19/21 1405   Change in Wound Size % 20 31 08/19/21 1405   Drainage Amount Small 08/19/21 1405   Drainage Description Serous 08/19/21 1405   Non-staged Wound Description Full thickness 08/19/21 1405       Wound 08/11/21 Traumatic Back Right;Upper (Active)   Wound Image Images linked 08/19/21 1408   Wound Description Epithelialization 08/19/21 1408   Wound Length (cm) 0 cm 08/19/21 1408   Wound Width (cm) 0 cm 08/19/21 1408   Wound Depth (cm) 0 cm 08/19/21 1408   Wound Surface Area (cm^2) 0 cm^2 08/19/21 1408   Wound Volume (cm^3) 0 cm^3 08/19/21 1408   Calculated Wound Volume (cm^3) 0 cm^3 08/19/21 1408   Change in Wound Size % 100 08/19/21 1408   Drainage Amount None 08/19/21 1408                   Wound Instructions:  Orders Placed This Encounter   Procedures    Wound cleansing and dressings     For Wounds on Right Shin and Right Knee:  Wash your hands with soap and water  Remove old dressing, discard into plastic bag and place in trash    Cleanse the wound with mild soap  (such as Dove) and water prior to applying a clean dressing  Do not use tissue or cotton balls  Do not scrub the wound  Pat dry using gauze  Shower yes   Apply mupirocin ointment to the wound  Cover with dry sterile dressing (bordered gauze dressing)  Change dressing daily     For Head wound:  Wash your hands with soap and water  Remove old dressing, discard into plastic bag and place in trash  Cleanse the wound with mild soap/shampoo prior to applying a clean dressing  Do not use tissue or cotton balls  Do not scrub the wound  Pat dry using gauze  Shower yes   Apply mupirocin ointment to the head wound  You may keep open with no dressing  Keep wound moist with the mupricin ointment       For back wounds:  Shower yes   Wound is healed, no dressing required  Continue to wear soft fabric shirts     Take multivitamin every day (one-a-day mens or centrum silver) in addition to meals on wheels food  Ask your primary care physician about an iron supplement prescription  Come back in one weeks for follow up visit  Today's wound treatment note:  Wounds cleansed with normal saline and redressed as ordered above     Standing Status:   Future     Standing Expiration Date:   8/19/2022       Edwina Paul MD, CHT, CWS       Portions of the record may have been created with voice recognition software  Occasional wrong word or "sound alike" substitutions may have occurred due to the inherent limitations of voice recognition software  Read the chart carefully and recognize, using context, where substitutions have occurred

## 2021-08-19 NOTE — PATIENT INSTRUCTIONS
Orders Placed This Encounter   Procedures    Wound cleansing and dressings     For Wounds on Right Shin and Right Knee:  Wash your hands with soap and water  Remove old dressing, discard into plastic bag and place in trash  Cleanse the wound with mild soap  (such as Dove) and water prior to applying a clean dressing  Do not use tissue or cotton balls  Do not scrub the wound  Pat dry using gauze  Shower yes   Apply mupirocin ointment to the wound  Cover with dry sterile dressing (bordered gauze dressing)  Change dressing daily     For Head wound:  Wash your hands with soap and water  Remove old dressing, discard into plastic bag and place in trash  Cleanse the wound with mild soap/shampoo prior to applying a clean dressing  Do not use tissue or cotton balls  Do not scrub the wound  Pat dry using gauze  Shower yes   Apply mupirocin ointment to the head wound  You may keep open with no dressing  Keep wound moist with the mupricin ointment       For back wounds:  Shower yes   Wound is healed, no dressing required  Continue to wear soft fabric shirts     Take multivitamin every day (one-a-day mens or centrum silver) in addition to meals on wheels food  Ask your primary care physician about an iron supplement prescription  Come back in one weeks for follow up visit      Today's wound treatment note:  Wounds cleansed with normal saline and redressed as ordered above     Standing Status:   Future     Standing Expiration Date:   8/19/2022

## 2021-11-29 ENCOUNTER — APPOINTMENT (OUTPATIENT)
Dept: LAB | Facility: HOSPITAL | Age: 61
End: 2021-11-29
Payer: COMMERCIAL

## 2021-11-29 DIAGNOSIS — R19.4 CHANGE IN BOWEL HABITS: ICD-10-CM

## 2021-11-29 LAB
CRP SERPL QL: <3 MG/L
TSH SERPL DL<=0.05 MIU/L-ACNC: 0.22 UIU/ML (ref 0.36–3.74)

## 2021-11-29 PROCEDURE — 86140 C-REACTIVE PROTEIN: CPT

## 2021-11-29 PROCEDURE — 83516 IMMUNOASSAY NONANTIBODY: CPT

## 2021-11-29 PROCEDURE — 36415 COLL VENOUS BLD VENIPUNCTURE: CPT

## 2021-11-29 PROCEDURE — 84443 ASSAY THYROID STIM HORMONE: CPT

## 2021-11-30 LAB — TTG IGA SER-ACNC: <2 U/ML (ref 0–3)

## 2021-12-04 ENCOUNTER — APPOINTMENT (OUTPATIENT)
Dept: LAB | Facility: HOSPITAL | Age: 61
End: 2021-12-04
Payer: COMMERCIAL

## 2021-12-04 DIAGNOSIS — Z79.4 TYPE 2 DIABETES MELLITUS WITH HYPERGLYCEMIA, WITH LONG-TERM CURRENT USE OF INSULIN (HCC): ICD-10-CM

## 2021-12-04 DIAGNOSIS — E11.65 TYPE 2 DIABETES MELLITUS WITH HYPERGLYCEMIA, WITH LONG-TERM CURRENT USE OF INSULIN (HCC): ICD-10-CM

## 2021-12-04 LAB
CHOLEST SERPL-MCNC: 206 MG/DL
CORTIS SERPL-MCNC: 15.6 UG/DL
HDLC SERPL-MCNC: 63 MG/DL
LDLC SERPL CALC-MCNC: 119 MG/DL (ref 0–100)
NONHDLC SERPL-MCNC: 143 MG/DL
TRIGL SERPL-MCNC: 122 MG/DL
TSH SERPL DL<=0.05 MIU/L-ACNC: 0.34 UIU/ML (ref 0.36–3.74)

## 2021-12-04 PROCEDURE — 80061 LIPID PANEL: CPT

## 2021-12-04 PROCEDURE — 36415 COLL VENOUS BLD VENIPUNCTURE: CPT

## 2021-12-04 PROCEDURE — 84443 ASSAY THYROID STIM HORMONE: CPT

## 2021-12-04 PROCEDURE — 82533 TOTAL CORTISOL: CPT

## 2021-12-18 ENCOUNTER — APPOINTMENT (OUTPATIENT)
Dept: LAB | Facility: HOSPITAL | Age: 61
End: 2021-12-18
Payer: COMMERCIAL

## 2021-12-18 DIAGNOSIS — R19.4 CHANGE IN BOWEL HABITS: ICD-10-CM

## 2021-12-18 DIAGNOSIS — Z79.4 TYPE 2 DIABETES MELLITUS WITH HYPERGLYCEMIA, WITH LONG-TERM CURRENT USE OF INSULIN (HCC): ICD-10-CM

## 2021-12-18 DIAGNOSIS — E11.65 TYPE 2 DIABETES MELLITUS WITH HYPERGLYCEMIA, WITH LONG-TERM CURRENT USE OF INSULIN (HCC): ICD-10-CM

## 2021-12-18 LAB
CRP SERPL QL: <3 MG/L
IGA SERPL-MCNC: <8 MG/DL (ref 70–400)
TSH SERPL DL<=0.05 MIU/L-ACNC: 0.34 UIU/ML (ref 0.36–3.74)

## 2021-12-18 PROCEDURE — 36415 COLL VENOUS BLD VENIPUNCTURE: CPT

## 2021-12-18 PROCEDURE — 82784 ASSAY IGA/IGD/IGG/IGM EACH: CPT

## 2021-12-18 PROCEDURE — 83516 IMMUNOASSAY NONANTIBODY: CPT

## 2021-12-18 PROCEDURE — 84443 ASSAY THYROID STIM HORMONE: CPT

## 2021-12-18 PROCEDURE — 86140 C-REACTIVE PROTEIN: CPT

## 2021-12-20 LAB — TTG IGA SER-ACNC: <2 U/ML (ref 0–3)

## 2022-01-27 ENCOUNTER — OFFICE VISIT (OUTPATIENT)
Dept: WOUND CARE | Facility: CLINIC | Age: 62
End: 2022-01-27
Payer: MEDICARE

## 2022-01-27 VITALS
HEIGHT: 71 IN | WEIGHT: 155 LBS | HEART RATE: 96 BPM | BODY MASS INDEX: 21.7 KG/M2 | RESPIRATION RATE: 18 BRPM | SYSTOLIC BLOOD PRESSURE: 104 MMHG | TEMPERATURE: 98.8 F | DIASTOLIC BLOOD PRESSURE: 60 MMHG

## 2022-01-27 DIAGNOSIS — S81.801A OPEN WOUND OF RIGHT LOWER EXTREMITY WITHOUT COMPLICATION, INITIAL ENCOUNTER: Primary | ICD-10-CM

## 2022-01-27 DIAGNOSIS — L85.3 XEROSIS CUTIS: ICD-10-CM

## 2022-01-27 DIAGNOSIS — S61.502A OPEN WOUND OF LEFT WRIST, INITIAL ENCOUNTER: ICD-10-CM

## 2022-01-27 DIAGNOSIS — S01.00XA OPEN WOUND OF SCALP, UNSPECIFIED OPEN WOUND TYPE, INITIAL ENCOUNTER: ICD-10-CM

## 2022-01-27 DIAGNOSIS — S81.001A OPEN WOUND OF RIGHT KNEE, INITIAL ENCOUNTER: ICD-10-CM

## 2022-01-27 PROCEDURE — 99213 OFFICE O/P EST LOW 20 MIN: CPT | Performed by: FAMILY MEDICINE

## 2022-01-27 PROCEDURE — 97597 DBRDMT OPN WND 1ST 20 CM/<: CPT | Performed by: FAMILY MEDICINE

## 2022-01-27 PROCEDURE — 99214 OFFICE O/P EST MOD 30 MIN: CPT | Performed by: FAMILY MEDICINE

## 2022-01-27 RX ORDER — LIDOCAINE 40 MG/G
CREAM TOPICAL ONCE
Status: COMPLETED | OUTPATIENT
Start: 2022-01-27 | End: 2022-01-27

## 2022-01-27 RX ORDER — INSULIN GLARGINE 100 [IU]/ML
20 INJECTION, SOLUTION SUBCUTANEOUS
COMMUNITY
End: 2022-04-06 | Stop reason: HOSPADM

## 2022-01-27 RX ORDER — AMMONIUM LACTATE 12 G/100G
LOTION TOPICAL
Qty: 400 G | Refills: 1 | Status: SHIPPED | OUTPATIENT
Start: 2022-01-27

## 2022-01-27 RX ADMIN — LIDOCAINE: 40 CREAM TOPICAL at 10:54

## 2022-01-27 NOTE — PATIENT INSTRUCTIONS
Orders Placed This Encounter   Procedures    Wound cleansing and dressings     Head wound: In wound center today:  Allpy Allevyn bordered foam dressing  Leave dressing in place as long as you can (until your next visit if possible)  If dressing comes off, replace with foam dressing given to you and leave in place until next visit  Tubigrip G "cap" to head to help keep dressing in place- wear as much as you can and during the night (if it feels too tight, gently remove and then replace when you can)    Arm and leg wounds:  Wash your hands with soap and water  Remove old dressing, discard into plastic bag and place in trash  STOP Hydrogen peroxide  Cleanse the wound with mild soap (such as dove) and rinse well with clear water and pat dry prior to applying a clean dressing  Do not use tissue or cotton balls  Do not scrub the wound  Pat dry using gauze    Shower no; do not get wounds or dressings wet   Apply LacHydrin lotion to dry skin on lower leg and arm (do not get in wounds)  Apply mupirociun ointment to all wounds    Cover with gauze  Secure with rolled gauze and tape (NO Bandaids)  Change dressing every day  Tubifast blue over all to keep dressings in place      Follow up in 1 week    Today's wound treatment note:  Wounds cleansed with normal; saline and redressed as ordered above     Standing Status:   Future     Standing Expiration Date:   1/27/2023    Debridement     This order was created via procedure documentation

## 2022-01-27 NOTE — PROGRESS NOTES
Patient ID: Garcia Elizondo is a 62 y o  male Date of Birth 1960       Chief Complaint   Patient presents with    New Patient Visit     Initial visit for patient who presents with open wounds of the R pre-tibial LE, L wrist and head  The wound on the head has bee open for approx 10 months and the leg and wrist approx 2 weeks  Wounds are currentl;y being treated with neosporin ointment       Allergies:  Novocain [procaine]    Diagnosis:   Diagnosis ICD-10-CM Associated Orders   1  Open wound of right lower extremity without complication, initial encounter  S81 801A lidocaine (LMX) 4 % cream     Wound cleansing and dressings     mupirocin (BACTROBAN) 2 % ointment   2  Open wound of scalp, unspecified open wound type, initial encounter  S01  00XA lidocaine (LMX) 4 % cream     Wound cleansing and dressings     Debridement     mupirocin (BACTROBAN) 2 % ointment   3  Open wound of left wrist, initial encounter  S61 502A lidocaine (LMX) 4 % cream     Wound cleansing and dressings   4  Xerosis cutis  L85 3 ammonium lactate (LAC-HYDRIN) 12 % lotion   5  Open wound of right knee, initial encounter  S81 001A mupirocin (BACTROBAN) 2 % ointment        Assessment  & Plan:     Multiple traumatic wounds secondary to scratching of the right lower extremity, the left forearm and scalp   Xerosis   Mupirocin ointment to the open wounds of the right leg and left forearm  Apply daily   Allevyn bordered foam to the scalp to keep on as long as possible  Subjective:   8/11/21: This is a 28-year-old male referred by his primary care physician for multiple open wounds on the right lower extremity, scalp and back  Patient has his sister company him today  She states that he has had these wounds on his right leg and back for many months  The scalp and right knee is due to a fall more recently  The patient admits that he scratches his right leg frequently as confirmed by blood under his fingernails    The back wounds are constantly irritated from him rubbing against things  Apparently the patient has had bed bug infestation that was treated recently  The patient cannot stop scratching  States he has chronic diarrhea and has lost approximately 120 lb over the past years  He has seems primary care physician recently and blood work was obtained today  He will be having stool culture as well  8/19/21:  Followup multiple traumatic wounds of the right leg, scalp, right knee and back  Patient has no complaints  Sister states that she has seen some blood on the toilet seat  She wonders whether there is something going on on the buttocks  The patient denies any problems  1/27/22:  Patient returns to the wound center with ongoing multiple traumatic wounds due to scratching and dry skin  He now has wounds on his right leg, scalp which never completely closed and the left forearm  Patient states that his skin is very dry and all the wounds are caused by him scratching  The one on the scalp was seen at last visit in August of 2021  Caregiver states that this one never completely healed  He currently is being treated with antibiotics for apparent C  Diff colitis  Type 2 diabetes, being followed by endocrinology  The following portions of the patient's history were reviewed and updated as appropriate: There is no problem list on file for this patient  Past Medical History:   Diagnosis Date    Diabetes mellitus ([facility] [address] )     Diarrhea      History reviewed  No pertinent surgical history    Family History   Problem Relation Age of Onset    Diabetes Mother     Cancer Mother     Thyroid disease Mother     Diabetes Father     Cancer Father     Hypertension Sister      Social History     Socioeconomic History    Marital status: Single     Spouse name: None    Number of children: None    Years of education: None    Highest education level: None   Occupational History    None   Tobacco Use    Smoking status: Never Smoker    Smokeless tobacco: Never Used   Vaping Use    Vaping Use: Never used   Substance and Sexual Activity    Alcohol use: Never    Drug use: Never    Sexual activity: None   Other Topics Concern    None   Social History Narrative    None     Social Determinants of Health     Financial Resource Strain: Not on file   Food Insecurity: Not on file   Transportation Needs: Not on file   Physical Activity: Not on file   Stress: Not on file   Social Connections: Not on file   Intimate Partner Violence: Not on file   Housing Stability: Not on file       Current Outpatient Medications:     insulin glargine (LANTUS) 100 units/mL subcutaneous injection, Inject 20 Units under the skin daily at bedtime, Disp: , Rfl:     insulin lispro protamine-insulin lispro (HumaLOG 75/25) 100 units/mL, Inject 7 Units under the skin 3 (three) times a day Before meals, Disp: , Rfl:     ammonium lactate (LAC-HYDRIN) 12 % lotion, Apply to dry skin twice daily as needed or when changing dressings  , Disp: 400 g, Rfl: 1    Continuous Blood Gluc  (FreeStyle Josiah 14 Day [place]) [facility], Use 1 each 4 (four) times a day, Disp: 1 each, Rfl: 0    Continuous Blood Gluc Sensor (FreeStyle Josiah 14 Day Sensor) MISC, Use 1 each 4 (four) times a day, Disp: 6 each, Rfl: 3    Empagliflozin (Jardiance) 10 MG TABS, Take 1 tablet (10 mg total) by mouth every morning (Patient not taking: Reported on 1/27/2022 ), Disp: 90 tablet, Rfl: 3    ergocalciferol (VITAMIN D2) 50,000 units, Take 1 capsule (50,000 Units total) by mouth once a week (Patient not taking: Reported on 8/11/2021), Disp: 12 capsule, Rfl: 3    ferrous sulfate 325 (65 Fe) mg tablet, Take 1 tablet (325 mg total) by mouth 2 (two) times a day with meals (Patient not taking: Reported on 8/11/2021), Disp: 180 tablet, Rfl: 3    glucose blood (FREESTYLE LITE) test strip, Use as instructed, Disp: 300 each, Rfl: 4    glucose monitoring kit (FREESTYLE) monitoring kit, Use 1 each 2 (two) times a day, Disp: 1 each, Rfl: 0    Lancets (freestyle) lancets, Use as instructed, Disp: 300 each, Rfl: 4    mupirocin (BACTROBAN) 2 % ointment, Apply topically daily, Disp: 22 g, Rfl: 1    sitaGLIPtin-metFORMIN (JANUMET)  MG per tablet, Take 1 tablet by mouth 2 (two) times a day with meals (Patient not taking: Reported on 1/27/2022 ), Disp: 180 tablet, Rfl: 3  No current facility-administered medications for this visit  Review of Systems   Constitutional: Positive for unexpected weight change  Negative for appetite change, chills, fatigue and fever  HENT: Negative for congestion, hearing loss and postnasal drip  Respiratory: Negative for cough and shortness of breath  Cardiovascular: Negative for leg swelling  Gastrointestinal: Positive for diarrhea  Blood on toilet seat, ? etiology   Musculoskeletal: Negative for gait problem  Skin: Positive for wound (Multiple on right lower extremity and left forearm as well as scalp)  Negative for rash  Neurological: Negative for numbness  Hematological: Does not bruise/bleed easily  Objective:  /60   Pulse 96   Temp 98 8 °F (37 1 °C)   Resp 18   Ht 5' 11" (1 803 m)   Wt 70 3 kg (155 lb)   BMI 21 62 kg/m²   Pain Score: 0-No pain     Physical Exam  Vitals and nursing note reviewed  Constitutional:       Appearance: Normal appearance  He is well-developed and normal weight  HENT:      Head: Normocephalic and atraumatic  Pulmonary:      Effort: Pulmonary effort is normal    Skin:     General: Skin is warm and dry  Findings: Wound present  No erythema  Comments: Abrasions of right shin, left forearm and scalp   Biofilm on the scalp wound  Other wounds clean without signs of infection  Neurological:      Mental Status: He is alert and oriented to person, place, and time  Mental status is at baseline  Motor: Weakness present     Psychiatric:         Attention and Perception: Attention normal  Mood and Affect: Mood and affect normal          Behavior: Behavior is cooperative  Cognition and Memory: Cognition is impaired  Debridement   Wound 01/27/22 Traumatic Head Superior    Fielding Protocol:  Consent: Verbal consent obtained  Written consent obtained  Consent given by: patient  Time out: Immediately prior to procedure a "time out" was called to verify the correct patient, procedure, equipment, support staff and site/side marked as required  Patient understanding: patient states understanding of the procedure being performed  Patient identity confirmed: verbally with patient      Performed by: physician  Debridement type: selective  Pain control: lidocaine 4%  Post-debridement measurements  Length (cm): 6  Width (cm): 3 2  Depth (cm): 0 1  Percent debrided: 100%  Surface Area (cm^2): 19 2  Area debrided (cm^2): 19 2  Volume (cm^3): 1 92  Devitalized tissue debrided: biofilm and fibrin  Instrument(s) utilized: curette  Bleeding: small  Hemostasis obtained with: pressure  Procedural pain (0-10): 0  Post-procedural pain: 0   Response to treatment: procedure was tolerated well                     Wound Instructions:  Orders Placed This Encounter   Procedures    Wound cleansing and dressings     Head wound: In wound center today:  Allpy Allevyn bordered foam dressing  Leave dressing in place as long as you can (until your next visit if possible)  If dressing comes off, replace with foam dressing given to you and leave in place until next visit  Tubigrip G "cap" to head to help keep dressing in place- wear as much as you can and during the night (if it feels too tight, gently remove and then replace when you can)    Arm and leg wounds:  Wash your hands with soap and water  Remove old dressing, discard into plastic bag and place in trash  STOP Hydrogen peroxide    Cleanse the wound with mild soap (such as dove) and rinse well with clear water and pat dry prior to applying a clean dressing  Do not use tissue or cotton balls  Do not scrub the wound  Pat dry using gauze  Shower no; do not get wounds or dressings wet   Apply LacHydrin lotion to dry skin on lower leg and arm (do not get in wounds)  Apply mupirociun ointment to all wounds    Cover with gauze  Secure with rolled gauze and tape (NO Bandaids)  Change dressing every day  Tubifast blue over all to keep dressings in place      Follow up in 1 week    Today's wound treatment note:  Wounds cleansed with normal; saline and redressed as ordered above     Standing Status:   Future     Standing Expiration Date:   1/27/2023    Debridement     This order was created via procedure documentation       Claudette John MD, CHT, CWS       Portions of the record may have been created with voice recognition software  Occasional wrong word or "sound alike" substitutions may have occurred due to the inherent limitations of voice recognition software  Read the chart carefully and recognize, using context, where substitutions have occurred

## 2022-02-03 ENCOUNTER — OFFICE VISIT (OUTPATIENT)
Dept: WOUND CARE | Facility: CLINIC | Age: 62
End: 2022-02-03
Payer: MEDICARE

## 2022-02-03 VITALS
HEART RATE: 86 BPM | DIASTOLIC BLOOD PRESSURE: 60 MMHG | RESPIRATION RATE: 16 BRPM | SYSTOLIC BLOOD PRESSURE: 110 MMHG | TEMPERATURE: 96.7 F

## 2022-02-03 DIAGNOSIS — L85.3 XEROSIS CUTIS: ICD-10-CM

## 2022-02-03 DIAGNOSIS — S61.502D OPEN WOUND OF LEFT WRIST, SUBSEQUENT ENCOUNTER: ICD-10-CM

## 2022-02-03 DIAGNOSIS — S81.801A OPEN WOUND OF RIGHT LOWER EXTREMITY WITHOUT COMPLICATION, INITIAL ENCOUNTER: ICD-10-CM

## 2022-02-03 DIAGNOSIS — S01.00XA OPEN WOUND OF SCALP, UNSPECIFIED OPEN WOUND TYPE, INITIAL ENCOUNTER: Primary | ICD-10-CM

## 2022-02-03 PROCEDURE — 99213 OFFICE O/P EST LOW 20 MIN: CPT | Performed by: FAMILY MEDICINE

## 2022-02-03 NOTE — PATIENT INSTRUCTIONS
Orders Placed This Encounter   Procedures    Wound cleansing and dressings     Wound cleansing and dressings       Head wound: In wound center today:  Apply xeroform to wound bed and cover with tegaderm  Leave dressing in place as long as you can (until your next visit if possible)  If dressing comes off, replace with xerform and tegaderm dressing given to you and leave in place until next visit        leg wounds:  Wash your hands with soap and water  Remove old dressing, discard into plastic bag and place in trash  STOP Hydrogen peroxide  Cleanse the wound with mild soap (such as dove) and rinse well with clear water and pat dry prior to applying a clean dressing  Do not use tissue or cotton balls  Do not scrub the wound  Pat dry using gauze  Shower Yes  Apply LacHydrin lotion to dry skin on lower leg and arm (do not get in wounds)  Apply mupirociun ointment to all wounds    Cover with gauze  Secure with rolled gauze and tape (NO Bandaids)  Change dressing every day  Use ace to keep dressing in place    Follow up in 2 weeks     Today's wound treatment note:  Wounds cleansed with normal; saline and redressed as ordered above  Hair around head wound shaved       Standing Status:   Future     Standing Expiration Date:   2/3/2023

## 2022-02-03 NOTE — PROGRESS NOTES
Patient ID: Tang Roche is a 62 y o  male Date of Birth 1960       Chief Complaint   Patient presents with    Follow Up Wound Care Visit     right leg wound , head wound, left arm wound,       Allergies:  Novocain [procaine]    Diagnosis:   Diagnosis ICD-10-CM Associated Orders   1  Open wound of scalp, unspecified open wound type, initial encounter  S01  00XA Wound cleansing and dressings   2  Open wound of right lower extremity without complication, initial encounter  S81 801A    3  Open wound of left wrist, subsequent encounter  S61 502D    4  Xerosis cutis  L85 3         Assessment  & Plan:     Open wounds of the right lower extremity is now dry with eschars  Continue with mupirocin ointment until completely healed   Scalp wound remains open  He has not been to compliant with dressings  We have shaved the surrounding scalp and applied Xeroform and Tegaderm  This can be changed 3 times a week and p r n  displacement  Encouraged patient not to scratch his dry skin  Subjective:   8/11/21: This is a 29-year-old male referred by his primary care physician for multiple open wounds on the right lower extremity, scalp and back  Patient has his sister company him today  She states that he has had these wounds on his right leg and back for many months  The scalp and right knee is due to a fall more recently  The patient admits that he scratches his right leg frequently as confirmed by blood under his fingernails  The back wounds are constantly irritated from him rubbing against things  Apparently the patient has had bed bug infestation that was treated recently  The patient cannot stop scratching  States he has chronic diarrhea and has lost approximately 120 lb over the past years  He has seems primary care physician recently and blood work was obtained today  He will be having stool culture as well      8/19/21:  Followup multiple traumatic wounds of the right leg, scalp, right knee and back   Patient has no complaints  Sister states that she has seen some blood on the toilet seat  She wonders whether there is something going on on the buttocks  The patient denies any problems  1/27/22:  Patient returns to the wound center with ongoing multiple traumatic wounds due to scratching and dry skin  He now has wounds on his right leg, scalp which never completely closed and the left forearm  Patient states that his skin is very dry and all the wounds are caused by him scratching  The one on the scalp was seen at last visit in August of 2021  Caregiver states that this one never completely healed  He currently is being treated with antibiotics for apparent C  Diff colitis  Type 2 diabetes, being followed by endocrinology  02/03/22: Followup of multiple traumatic wounds due to scratching of dry skin  Scalp wound is still present and the right lower extremity shin area  He has been using mupirocin ointment daily  He has been having difficulty keeping his dressing on his scalp  The following portions of the patient's history were reviewed and updated as appropriate: There is no problem list on file for this patient  Past Medical History:   Diagnosis Date    Diabetes mellitus ([facility] [address] )     Diarrhea      No past surgical history on file    Family History   Problem Relation Age of Onset    Diabetes Mother     Cancer Mother     Thyroid disease Mother     Diabetes Father     Cancer Father     Hypertension Sister      Social History     Socioeconomic History    Marital status: Single     Spouse name: None    Number of children: None    Years of education: None    Highest education level: None   Occupational History    None   Tobacco Use    Smoking status: Never Smoker    Smokeless tobacco: Never Used   Vaping Use    Vaping Use: Never used   Substance and Sexual Activity    Alcohol use: Never    Drug use: Never    Sexual activity: None   Other Topics Concern    None Social History Narrative    None     Social Determinants of Health     Financial Resource Strain: Not on file   Food Insecurity: Not on file   Transportation Needs: Not on file   Physical Activity: Not on file   Stress: Not on file   Social Connections: Not on file   Intimate Partner Violence: Not on file   Housing Stability: Not on file       Current Outpatient Medications:     ammonium lactate (LAC-HYDRIN) 12 % lotion, Apply to dry skin twice daily as needed or when changing dressings  , Disp: 400 g, Rfl: 1    Continuous Blood Gluc  (FreeStyle Josiah 14 Day [place]) DUNIA, Use 1 each 4 (four) times a day, Disp: 1 each, Rfl: 0    Continuous Blood Gluc Sensor (FreeStyle Josiah 14 Day Sensor) MISC, Use 1 each 4 (four) times a day, Disp: 6 each, Rfl: 3    Empagliflozin (Jardiance) 10 MG TABS, Take 1 tablet (10 mg total) by mouth every morning (Patient not taking: Reported on 1/27/2022 ), Disp: 90 tablet, Rfl: 3    ergocalciferol (VITAMIN D2) 50,000 units, Take 1 capsule (50,000 Units total) by mouth once a week (Patient not taking: Reported on 8/11/2021), Disp: 12 capsule, Rfl: 3    ferrous sulfate 325 (65 Fe) mg tablet, Take 1 tablet (325 mg total) by mouth 2 (two) times a day with meals (Patient not taking: Reported on 8/11/2021), Disp: 180 tablet, Rfl: 3    glucose blood (FREESTYLE LITE) test strip, Use as instructed, Disp: 300 each, Rfl: 4    glucose monitoring kit (FREESTYLE) monitoring kit, Use 1 each 2 (two) times a day, Disp: 1 each, Rfl: 0    insulin glargine (LANTUS) 100 units/mL subcutaneous injection, Inject 20 Units under the skin daily at bedtime, Disp: , Rfl:     insulin lispro protamine-insulin lispro (HumaLOG 75/25) 100 units/mL, Inject 7 Units under the skin 3 (three) times a day Before meals, Disp: , Rfl:     Lancets (freestyle) lancets, Use as instructed, Disp: 300 each, Rfl: 4    mupirocin (BACTROBAN) 2 % ointment, Apply topically daily, Disp: 22 g, Rfl: 1   sitaGLIPtin-metFORMIN (JANUMET)  MG per tablet, Take 1 tablet by mouth 2 (two) times a day with meals (Patient not taking: Reported on 1/27/2022 ), Disp: 180 tablet, Rfl: 3    Review of Systems   Constitutional: Positive for unexpected weight change  Negative for appetite change, chills, fatigue and fever  HENT: Negative for congestion, hearing loss and postnasal drip  Respiratory: Negative for cough and shortness of breath  Cardiovascular: Negative for leg swelling  Gastrointestinal: Positive for diarrhea  Blood on toilet seat, ? etiology   Musculoskeletal: Negative for gait problem  Skin: Positive for wound (Multiple on right lower extremity and left forearm as well as scalp)  Negative for rash  Neurological: Negative for numbness  Hematological: Does not bruise/bleed easily  Objective:  /60   Pulse 86   Temp (!) 96 7 °F (35 9 °C)   Resp 16   Pain Score: 0-No pain     Physical Exam  Vitals and nursing note reviewed  Constitutional:       Appearance: Normal appearance  He is well-developed and normal weight  HENT:      Head: Normocephalic and atraumatic  Pulmonary:      Effort: Pulmonary effort is normal    Skin:     General: Skin is warm and dry  Findings: Wound present  No erythema  Comments: Abrasions of right shin, left forearm and scalp abrasions on the left forearm are now closed  Scalp remains open but no biofilm  Scant drainage  Right lower extremity with dry eschars   Neurological:      Mental Status: He is alert and oriented to person, place, and time  Mental status is at baseline  Motor: Weakness present  Psychiatric:         Attention and Perception: Attention normal          Mood and Affect: Mood and affect normal          Behavior: Behavior is cooperative  Cognition and Memory: Cognition is impaired  Wound 01/27/22 Traumatic Other (Comment) Leg Right; Lower; Anterior;Distal (Active)   Wound Image   02/03/22 1017   Wound Description Granulation tissue 02/03/22 1027   Leticia-wound Assessment Scar Tissue; Intact 02/03/22 1027   Wound Length (cm) 0 cm 02/03/22 1027   Wound Width (cm) 0 cm 02/03/22 1027   Wound Depth (cm) 0 cm 02/03/22 1027   Wound Surface Area (cm^2) 0 cm^2 02/03/22 1027   Wound Volume (cm^3) 0 cm^3 02/03/22 1027   Calculated Wound Volume (cm^3) 0 cm^3 02/03/22 1027   Change in Wound Size % 100 02/03/22 1027   Drainage Amount Scant 02/03/22 1027   Drainage Description Bloody 01/27/22 1004   Treatments Irrigation with NSS 02/03/22 1027   Dressing Changed Changed 02/03/22 1027   Patient Tolerance Tolerated well 02/03/22 1027       Wound 01/27/22 Traumatic Wrist Left;Medial (Active)   Wound Image   02/03/22 1015   Wound Description Dry; Other (Comment) 01/27/22 1000   Leticia-wound Assessment Mosses 01/27/22 1000   Wound Length (cm) 0 cm 02/03/22 1023   Wound Width (cm) 0 cm 02/03/22 1023   Wound Depth (cm) 0 cm 02/03/22 1023   Wound Surface Area (cm^2) 0 cm^2 02/03/22 1023   Wound Volume (cm^3) 0 cm^3 02/03/22 1023   Calculated Wound Volume (cm^3) 0 cm^3 02/03/22 1023   Change in Wound Size % 100 02/03/22 1023   Drainage Amount None 02/03/22 1023       Wound 01/27/22 Traumatic Head Superior (Active)   Wound Image   02/03/22 1012   Wound Description Pink;Granulation tissue 02/03/22 1018   Wound Length (cm) 5 cm 02/03/22 1018   Wound Width (cm) 3 cm 02/03/22 1018   Wound Depth (cm) 0 1 cm 02/03/22 1018   Wound Surface Area (cm^2) 15 cm^2 02/03/22 1018   Wound Volume (cm^3) 1 5 cm^3 02/03/22 1018   Calculated Wound Volume (cm^3) 1 5 cm^3 02/03/22 1018   Change in Wound Size % 21 88 02/03/22 1018   Drainage Amount Scant 02/03/22 1018   Drainage Description Bloody 02/03/22 1018   Non-staged Wound Description Full thickness 02/03/22 1018   Treatments Irrigation with NSS 02/03/22 1018   Dressing Changed Changed 02/03/22 1018   Patient Tolerance Tolerated well 02/03/22 1018       Wound 01/27/22 Traumatic Leg Right;Proximal;Lower (Active)   Wound Image   02/03/22 1016   Wound Description Dry; Other (Comment) 02/03/22 1024   Leticia-wound Assessment Pink 02/03/22 1024   Wound Length (cm) 0 cm 02/03/22 1024   Wound Width (cm) 0 cm 02/03/22 1024   Wound Depth (cm) 0 cm 02/03/22 1024   Wound Surface Area (cm^2) 0 cm^2 02/03/22 1024   Wound Volume (cm^3) 0 cm^3 02/03/22 1024   Calculated Wound Volume (cm^3) 0 cm^3 02/03/22 1024   Change in Wound Size % 100 02/03/22 1024   Drainage Amount None 02/03/22 1024   Dressing Status Removed 02/03/22 1024       Wound 01/27/22 Traumatic Wrist Left;Lateral (Active)   Wound Image   02/03/22 1013   Wound Description Dry; Other (Comment) 01/27/22 1001   Leticia-wound Assessment [place] 01/27/22 1001   Wound Length (cm) 0 cm 02/03/22 1023   Wound Width (cm) 0 cm 02/03/22 1023   Wound Depth (cm) 0 cm 02/03/22 1023   Wound Surface Area (cm^2) 0 cm^2 02/03/22 1023   Wound Volume (cm^3) 0 cm^3 02/03/22 1023   Calculated Wound Volume (cm^3) 0 cm^3 02/03/22 1023   Change in Wound Size % 100 02/03/22 1023   Drainage Amount None 02/03/22 1023                     Wound Instructions:  Orders Placed This Encounter   Procedures    Wound cleansing and dressings     Wound cleansing and dressings       Head wound: In wound center today:  Apply xeroform to wound bed and cover with tegaderm  Leave dressing in place as long as you can (until your next visit if possible)  If dressing comes off, replace with xerform and tegaderm dressing given to you and leave in place until next visit        leg wounds:  Wash your hands with soap and water  Remove old dressing, discard into plastic bag and place in trash  STOP Hydrogen peroxide  Cleanse the wound with mild soap (such as dove) and rinse well with clear water and pat dry prior to applying a clean dressing  Do not use tissue or cotton balls  Do not scrub the wound  Pat dry using gauze    Shower Yes  Apply LacHydrin lotion to dry skin on lower leg and arm (do not get in wounds)  Apply mupirociun ointment to all wounds    Cover with gauze  Secure with rolled gauze and tape (NO Bandaids)  Change dressing every day  Use ace to keep dressing in place    Follow up in 2 weeks     Today's wound treatment note:  Wounds cleansed with normal; saline and redressed as ordered above  Hair around head wound shaved  Standing Status:   Future     Standing Expiration Date:   2/3/2023       Marie Urias MD, CHT, CWS       Portions of the record may have been created with voice recognition software  Occasional wrong word or "sound alike" substitutions may have occurred due to the inherent limitations of voice recognition software  Read the chart carefully and recognize, using context, where substitutions have occurred

## 2022-02-16 ENCOUNTER — OFFICE VISIT (OUTPATIENT)
Dept: WOUND CARE | Facility: CLINIC | Age: 62
End: 2022-02-16
Payer: COMMERCIAL

## 2022-02-16 VITALS
SYSTOLIC BLOOD PRESSURE: 104 MMHG | TEMPERATURE: 96.2 F | RESPIRATION RATE: 20 BRPM | HEART RATE: 100 BPM | DIASTOLIC BLOOD PRESSURE: 62 MMHG

## 2022-02-16 DIAGNOSIS — S01.00XA OPEN WOUND OF SCALP, UNSPECIFIED OPEN WOUND TYPE, INITIAL ENCOUNTER: Primary | ICD-10-CM

## 2022-02-16 DIAGNOSIS — L85.3 XEROSIS CUTIS: ICD-10-CM

## 2022-02-16 DIAGNOSIS — S81.801A OPEN WOUND OF RIGHT LOWER EXTREMITY WITHOUT COMPLICATION, INITIAL ENCOUNTER: ICD-10-CM

## 2022-02-16 DIAGNOSIS — S61.502D OPEN WOUND OF LEFT WRIST, SUBSEQUENT ENCOUNTER: ICD-10-CM

## 2022-02-16 PROCEDURE — 99213 OFFICE O/P EST LOW 20 MIN: CPT | Performed by: FAMILY MEDICINE

## 2022-02-16 NOTE — PROGRESS NOTES
Patient ID: Rohit Vieira is a 62 y o  male Date of Birth 1960       Chief Complaint   Patient presents with    Follow Up Wound Care Visit     Head, Left wrist and right LE wounds       Allergies:  Novocain [procaine]    Diagnosis:   Diagnosis ICD-10-CM Associated Orders   1  Open wound of scalp, unspecified open wound type, initial encounter  S01  00XA Wound cleansing and dressings   2  Open wound of right lower extremity without complication, initial encounter  S81 801A    3  Open wound of left wrist, subsequent encounter  S61 502D    4  Xerosis cutis  L85 3         Assessment  & Plan:     All wounds have close with the exception of the scalp  Scalp wound is much less also   Discontinue Tegaderm and Xeroform  Start hydrocolloid be changed twice a week and diogenes Garrido  Follow up in two weeks unless closed at which time they will call  Subjective:   8/11/21: This is a 62-year-old male referred by his primary care physician for multiple open wounds on the right lower extremity, scalp and back  Patient has his sister company him today  She states that he has had these wounds on his right leg and back for many months  The scalp and right knee is due to a fall more recently  The patient admits that he scratches his right leg frequently as confirmed by blood under his fingernails  The back wounds are constantly irritated from him rubbing against things  Apparently the patient has had bed bug infestation that was treated recently  The patient cannot stop scratching  States he has chronic diarrhea and has lost approximately 120 lb over the past years  He has seems primary care physician recently and blood work was obtained today  He will be having stool culture as well  8/19/21:  Followup multiple traumatic wounds of the right leg, scalp, right knee and back  Patient has no complaints  Sister states that she has seen some blood on the toilet seat    She wonders whether there is something going on on the buttocks  The patient denies any problems  1/27/22:  Patient returns to the wound center with ongoing multiple traumatic wounds due to scratching and dry skin  He now has wounds on his right leg, scalp which never completely closed and the left forearm  Patient states that his skin is very dry and all the wounds are caused by him scratching  The one on the scalp was seen at last visit in August of 2021  Caregiver states that this one never completely healed  He currently is being treated with antibiotics for apparent C  Diff colitis  Type 2 diabetes, being followed by endocrinology  02/03/22: Followup of multiple traumatic wounds due to scratching of dry skin  Scalp wound is still present and the right lower extremity shin area  He has been using mupirocin ointment daily  He has been having difficulty keeping his dressing on his scalp  2/16/22: Followup multiple traumatic wounds of the skin due to scratching  All wounds apparently have closed with the exception of the scalp which is improved  No new complaints  They did have difficulty keeping the Tegaderm on the scalp  The following portions of the patient's history were reviewed and updated as appropriate: There is no problem list on file for this patient  Past Medical History:   Diagnosis Date    Diabetes mellitus ([facility] [address] )     Diarrhea      No past surgical history on file    Family History   Problem Relation Age of Onset    Diabetes Mother     Cancer Mother     Thyroid disease Mother     Diabetes Father     Cancer Father     Hypertension Sister      Social History     Socioeconomic History    Marital status: Single     Spouse name: None    Number of children: None    Years of education: None    Highest education level: None   Occupational History    None   Tobacco Use    Smoking status: Never Smoker    Smokeless tobacco: Never Used   Vaping Use    Vaping Use: Never used   Substance and Sexual Activity  Alcohol use: Never    Drug use: Never    Sexual activity: None   Other Topics Concern    None   Social History Narrative    None     Social Determinants of Health     Financial Resource Strain: Not on file   Food Insecurity: Not on file   Transportation Needs: Not on file   Physical Activity: Not on file   Stress: Not on file   Social Connections: Not on file   Intimate Partner Violence: Not on file   Housing Stability: Not on file       Current Outpatient Medications:     ammonium lactate (LAC-HYDRIN) 12 % lotion, Apply to dry skin twice daily as needed or when changing dressings  , Disp: 400 g, Rfl: 1    Continuous Blood Gluc  (FreeStyle Josiah 14 Day [place]) DUNIA, Use 1 each 4 (four) times a day, Disp: 1 each, Rfl: 0    Continuous Blood Gluc Sensor (FreeStyle Josiah 14 Day Sensor) MISC, Use 1 each 4 (four) times a day, Disp: 6 each, Rfl: 3    Empagliflozin (Jardiance) 10 MG TABS, Take 1 tablet (10 mg total) by mouth every morning (Patient not taking: Reported on 1/27/2022 ), Disp: 90 tablet, Rfl: 3    ergocalciferol (VITAMIN D2) 50,000 units, Take 1 capsule (50,000 Units total) by mouth once a week (Patient not taking: Reported on 8/11/2021), Disp: 12 capsule, Rfl: 3    ferrous sulfate 325 (65 Fe) mg tablet, Take 1 tablet (325 mg total) by mouth 2 (two) times a day with meals (Patient not taking: Reported on 8/11/2021), Disp: 180 tablet, Rfl: 3    glucose blood (FREESTYLE LITE) test strip, Use as instructed, Disp: 300 each, Rfl: 4    glucose monitoring kit (FREESTYLE) monitoring kit, Use 1 each 2 (two) times a day, Disp: 1 each, Rfl: 0    insulin glargine (LANTUS) 100 units/mL subcutaneous injection, Inject 20 Units under the skin daily at bedtime, Disp: , Rfl:     insulin lispro protamine-insulin lispro (HumaLOG 75/25) 100 units/mL, Inject 7 Units under the skin 3 (three) times a day Before meals, Disp: , Rfl:     Lancets (freestyle) lancets, Use as instructed, Disp: 300 each, Rfl: 4   mupirocin (BACTROBAN) 2 % ointment, Apply topically daily, Disp: 22 g, Rfl: 1    sitaGLIPtin-metFORMIN (JANUMET)  MG per tablet, Take 1 tablet by mouth 2 (two) times a day with meals (Patient not taking: Reported on 1/27/2022 ), Disp: 180 tablet, Rfl: 3    Review of Systems   Constitutional: Positive for unexpected weight change  Negative for appetite change, chills, fatigue and fever  HENT: Negative for congestion, hearing loss and postnasal drip  Respiratory: Negative for cough and shortness of breath  Cardiovascular: Negative for leg swelling  Gastrointestinal: Positive for diarrhea  Blood on toilet seat, ? etiology   Musculoskeletal: Negative for gait problem  Skin: Positive for wound (Multiple on right lower extremity and left forearm as well as scalp)  Negative for rash  Neurological: Negative for numbness  Hematological: Does not bruise/bleed easily  Objective:  /62   Pulse 100   Temp (!) 96 2 °F (35 7 °C)   Resp 20   Pain Score: 0-No pain     Physical Exam  Vitals and nursing note reviewed  Constitutional:       Appearance: Normal appearance  He is well-developed and normal weight  HENT:      Head: Normocephalic and atraumatic  Pulmonary:      Effort: Pulmonary effort is normal    Skin:     General: Skin is warm and dry  Findings: Wound present  No erythema  Comments: All wounds have closed with the exception of the scalp  This is smaller  Neurological:      Mental Status: He is alert and oriented to person, place, and time  Mental status is at baseline  Motor: Weakness present  Psychiatric:         Attention and Perception: Attention normal          Mood and Affect: Mood and affect normal          Behavior: Behavior is cooperative  Cognition and Memory: Cognition is impaired  Wound 01/27/22 Traumatic Other (Comment) Leg Right; Lower; Anterior;Distal (Active)   Wound Image Images linked 02/16/22 1035   Wound Description Light purple;Epithelialization;Eschar 02/16/22 1042   Leticia-wound Assessment Dry;Scaly 02/16/22 1042   Wound Length (cm) 0 cm 02/16/22 1042   Wound Width (cm) 0 cm 02/16/22 1042   Wound Depth (cm) 0 cm 02/16/22 1042   Wound Surface Area (cm^2) 0 cm^2 02/16/22 1042   Wound Volume (cm^3) 0 cm^3 02/16/22 1042   Calculated Wound Volume (cm^3) 0 cm^3 02/16/22 1042   Change in Wound Size % 100 02/16/22 1042   Drainage Amount None 02/16/22 1042   Non-staged Wound Description Not applicable 37/86/51 1166       Wound 01/27/22 Traumatic Wrist Left;Medial (Active)   Wound Image Images linked 02/16/22 1033   Wound Description Epithelialization 02/16/22 1040   Leticia-wound Assessment Scar Tissue 02/16/22 1040   Wound Length (cm) 0 cm 02/16/22 1040   Wound Width (cm) 0 cm 02/16/22 1040   Wound Depth (cm) 0 cm 02/16/22 1040   Wound Surface Area (cm^2) 0 cm^2 02/16/22 1040   Wound Volume (cm^3) 0 cm^3 02/16/22 1040   Calculated Wound Volume (cm^3) 0 cm^3 02/16/22 1040   Change in Wound Size % 100 02/16/22 1040   Drainage Amount None 02/16/22 1040   Non-staged Wound Description Not applicable 73/49/48 5696       Wound 01/27/22 Traumatic Head Superior (Active)   Wound Image Images linked 02/16/22 1031   Wound Description Eschar;Epithelialization;Granulation tissue; Yellow 02/16/22 1039   Leticia-wound Assessment Intact 02/16/22 1039   Wound Length (cm) 2 5 cm 02/16/22 1039   Wound Width (cm) 1 cm 02/16/22 1039   Wound Depth (cm) 0 1 cm 02/16/22 1039   Wound Surface Area (cm^2) 2 5 cm^2 02/16/22 1039   Wound Volume (cm^3) 0 25 cm^3 02/16/22 1039   Calculated Wound Volume (cm^3) 0 25 cm^3 02/16/22 1039   Change in Wound Size % 86 98 02/16/22 1039   Drainage Amount Scant 02/16/22 1039   Drainage Description Bloody 02/16/22 1039   Non-staged Wound Description Full thickness 02/16/22 1039       Wound 01/27/22 Traumatic Leg Right;Proximal;Lower (Active)   Wound Image Images linked 02/16/22 1035   Wound Description Epithelialization 02/16/22 1041   Leticia-wound Assessment Scar Tissue; Intact 02/16/22 1041   Wound Length (cm) 0 cm 02/16/22 1041   Wound Width (cm) 0 cm 02/16/22 1041   Wound Depth (cm) 0 cm 02/16/22 1041   Wound Surface Area (cm^2) 0 cm^2 02/16/22 1041   Wound Volume (cm^3) 0 cm^3 02/16/22 1041   Calculated Wound Volume (cm^3) 0 cm^3 02/16/22 1041   Change in Wound Size % 100 02/16/22 1041   Drainage Amount None 02/16/22 1041   Non-staged Wound Description Not applicable 79/22/30 4062       Wound 01/27/22 Traumatic Wrist Left;Lateral (Active)   Wound Image Images linked 02/16/22 1032   Wound Description Epithelialization 02/16/22 1041   Leticia-wound Assessment Scar Tissue 02/16/22 1041   Wound Length (cm) 0 cm 02/16/22 1041   Wound Width (cm) 0 cm 02/16/22 1041   Wound Depth (cm) 0 cm 02/16/22 1041   Wound Surface Area (cm^2) 0 cm^2 02/16/22 1041   Wound Volume (cm^3) 0 cm^3 02/16/22 1041   Calculated Wound Volume (cm^3) 0 cm^3 02/16/22 1041   Change in Wound Size % 100 02/16/22 1041   Drainage Amount None 02/16/22 1041   Non-staged Wound Description Not applicable 48/82/39 1582                     Wound Instructions:  Orders Placed This Encounter   Procedures    Wound cleansing and dressings     Head wound:  Wash your hands with soap and water  Cleanse the wound with NSS or wound cleanser prior to applying a clean dressing  Apply Hydrocolloid to the wound  Change dressing every 4 days and as needed for excessive drainage or bandage loosening up       Left wrist wounds and right leg wounds are healed today---may use a moisturizer to the leg and arm areas each day        Treatments above were completed today at the [facility]        Follow up at the [facility] in 2 weeks     Standing Status:   Future     Standing Expiration Date:   2/16/2023       Thomas Smith MD, CHT, CWS       Portions of the record may have been created with voice recognition software   Occasional wrong word or "sound alike" substitutions may have occurred due to the inherent limitations of voice recognition software  Read the chart carefully and recognize, using context, where substitutions have occurred

## 2022-02-16 NOTE — PATIENT INSTRUCTIONS
Orders Placed This Encounter   Procedures    Wound cleansing and dressings     Head wound:  Wash your hands with soap and water  Cleanse the wound with NSS or wound cleanser prior to applying a clean dressing  Apply Hydrocolloid to the wound     Change dressing every 4 days and as needed for excessive drainage or bandage loosening up       Left wrist wounds and right leg wounds are healed today---may use a moisturizer to the leg and arm areas each day        Treatments above were completed today at the [facility]        Follow up at the [facility] in 2 weeks     Standing Status:   Future     Standing Expiration Date:   2/16/2023

## 2022-04-05 ENCOUNTER — HOSPITAL ENCOUNTER (OUTPATIENT)
Facility: HOSPITAL | Age: 62
Setting detail: OBSERVATION
Discharge: HOME | End: 2022-04-06
Attending: EMERGENCY MEDICINE | Admitting: INTERNAL MEDICINE
Payer: COMMERCIAL

## 2022-04-05 ENCOUNTER — APPOINTMENT (EMERGENCY)
Dept: RADIOLOGY | Facility: HOSPITAL | Age: 62
End: 2022-04-05
Payer: COMMERCIAL

## 2022-04-05 ENCOUNTER — APPOINTMENT (EMERGENCY)
Dept: CT IMAGING | Facility: HOSPITAL | Age: 62
End: 2022-04-05
Payer: COMMERCIAL

## 2022-04-05 DIAGNOSIS — R73.9 HYPERGLYCEMIA: ICD-10-CM

## 2022-04-05 DIAGNOSIS — S22.080A COMPRESSION FRACTURE OF T11 VERTEBRA, INITIAL ENCOUNTER (HCC): ICD-10-CM

## 2022-04-05 DIAGNOSIS — N39.0 UTI (URINARY TRACT INFECTION): ICD-10-CM

## 2022-04-05 DIAGNOSIS — R11.2 NAUSEA AND VOMITING: Primary | ICD-10-CM

## 2022-04-05 PROBLEM — D72.829 LEUKOCYTOSIS: Status: ACTIVE | Noted: 2022-04-05

## 2022-04-05 LAB
2HR DELTA HS TROPONIN: -1 NG/L
4HR DELTA HS TROPONIN: -1 NG/L
ALBUMIN SERPL BCP-MCNC: 3.5 G/DL (ref 3–5.2)
ALBUMIN SERPL BCP-MCNC: 3.9 G/DL (ref 3–5.2)
ALP SERPL-CCNC: 82 U/L (ref 43–122)
ALP SERPL-CCNC: 95 U/L (ref 43–122)
ALT SERPL W P-5'-P-CCNC: 14 U/L
ALT SERPL W P-5'-P-CCNC: 15 U/L
ANION GAP SERPL CALCULATED.3IONS-SCNC: 12 MMOL/L (ref 5–14)
ANION GAP SERPL CALCULATED.3IONS-SCNC: 15 MMOL/L (ref 5–14)
AST SERPL W P-5'-P-CCNC: 30 U/L (ref 17–59)
AST SERPL W P-5'-P-CCNC: 33 U/L (ref 17–59)
ATRIAL RATE: 92 BPM
BACTERIA UR QL AUTO: ABNORMAL /HPF
BASE EX.OXY STD BLDV CALC-SCNC: 76 % (ref 60–80)
BASE EXCESS BLDV CALC-SCNC: -5.7 MMOL/L
BASOPHILS # BLD MANUAL: 0 THOUSAND/UL (ref 0–0.1)
BASOPHILS NFR MAR MANUAL: 0 % (ref 0–1)
BETA-HYDROXYBUTYRATE: 4.4 MMOL/L
BETA-HYDROXYBUTYRATE: 6.8 MMOL/L
BILIRUB SERPL-MCNC: 0.72 MG/DL
BILIRUB SERPL-MCNC: 0.87 MG/DL
BILIRUB UR QL STRIP: NEGATIVE
BUN SERPL-MCNC: 13 MG/DL (ref 5–25)
BUN SERPL-MCNC: 14 MG/DL (ref 5–25)
CALCIUM SERPL-MCNC: 8.4 MG/DL (ref 8.4–10.2)
CALCIUM SERPL-MCNC: 8.8 MG/DL (ref 8.4–10.2)
CARDIAC TROPONIN I PNL SERPL HS: 6 NG/L
CARDIAC TROPONIN I PNL SERPL HS: 6 NG/L
CARDIAC TROPONIN I PNL SERPL HS: 7 NG/L
CHLORIDE SERPL-SCNC: 103 MMOL/L (ref 97–108)
CHLORIDE SERPL-SCNC: 106 MMOL/L (ref 97–108)
CLARITY UR: CLEAR
CO2 SERPL-SCNC: 23 MMOL/L (ref 22–30)
CO2 SERPL-SCNC: 24 MMOL/L (ref 22–30)
COLOR UR: ABNORMAL
CREAT SERPL-MCNC: 0.71 MG/DL (ref 0.7–1.5)
CREAT SERPL-MCNC: 0.77 MG/DL (ref 0.7–1.5)
EOSINOPHIL # BLD MANUAL: 0 THOUSAND/UL (ref 0–0.4)
EOSINOPHIL NFR BLD MANUAL: 0 % (ref 0–6)
ERYTHROCYTE [DISTWIDTH] IN BLOOD BY AUTOMATED COUNT: 18.9 % (ref 11.6–15.1)
FLUAV RNA RESP QL NAA+PROBE: NEGATIVE
FLUBV RNA RESP QL NAA+PROBE: NEGATIVE
GFR SERPL CREATININE-BSD FRML MDRD: 101 ML/MIN/1.73SQ M
GFR SERPL CREATININE-BSD FRML MDRD: 97 ML/MIN/1.73SQ M
GLUCOSE SERPL-MCNC: 175 MG/DL (ref 65–140)
GLUCOSE SERPL-MCNC: 205 MG/DL (ref 65–140)
GLUCOSE SERPL-MCNC: 205 MG/DL (ref 70–99)
GLUCOSE SERPL-MCNC: 219 MG/DL (ref 65–140)
GLUCOSE SERPL-MCNC: 273 MG/DL (ref 65–140)
GLUCOSE SERPL-MCNC: 273 MG/DL (ref 70–99)
GLUCOSE SERPL-MCNC: 278 MG/DL (ref 65–140)
GLUCOSE UR STRIP-MCNC: NEGATIVE MG/DL
HCO3 BLDV-SCNC: 20 MMOL/L (ref 24–30)
HCT VFR BLD AUTO: 41.6 % (ref 36.5–49.3)
HGB BLD-MCNC: 12.4 G/DL (ref 12–17)
HGB UR QL STRIP.AUTO: NEGATIVE
HYALINE CASTS #/AREA URNS LPF: ABNORMAL /LPF
KETONES UR STRIP-MCNC: ABNORMAL MG/DL
LEUKOCYTE ESTERASE UR QL STRIP: 500
LIPASE SERPL-CCNC: 16 U/L (ref 23–300)
LYMPHOCYTES # BLD AUTO: 0.92 THOUSAND/UL (ref 0.6–4.47)
LYMPHOCYTES # BLD AUTO: 5 % (ref 14–44)
MCH RBC QN AUTO: 23.8 PG (ref 26.8–34.3)
MCHC RBC AUTO-ENTMCNC: 29.8 G/DL (ref 31.4–37.4)
MCV RBC AUTO: 80 FL (ref 82–98)
MONOCYTES # BLD AUTO: 0 THOUSAND/UL (ref 0–1.22)
MONOCYTES NFR BLD: 0 % (ref 4–12)
NEUTROPHILS # BLD MANUAL: 17.53 THOUSAND/UL (ref 1.85–7.62)
NEUTS BAND NFR BLD MANUAL: 1 % (ref 0–8)
NEUTS SEG NFR BLD AUTO: 94 % (ref 43–75)
NITRITE UR QL STRIP: NEGATIVE
NON-SQ EPI CELLS URNS QL MICRO: ABNORMAL /HPF
O2 CT BLDV-SCNC: 13.4 ML/DL
P AXIS: 57 DEGREES
PCO2 BLDV: 39.9 MM HG (ref 42–50)
PH BLDV: 7.32 [PH] (ref 7.3–7.4)
PH UR STRIP.AUTO: 6 [PH]
PLATELET # BLD AUTO: 270 THOUSANDS/UL (ref 149–390)
PLATELET BLD QL SMEAR: ADEQUATE
PMV BLD AUTO: 9.7 FL (ref 8.9–12.7)
PO2 BLDV: 42.3 MM HG (ref 35–45)
POTASSIUM SERPL-SCNC: 3.6 MMOL/L (ref 3.6–5)
POTASSIUM SERPL-SCNC: 4.2 MMOL/L (ref 3.6–5)
PR INTERVAL: 130 MS
PROT SERPL-MCNC: 6.7 G/DL (ref 5.9–8.4)
PROT SERPL-MCNC: 7.1 G/DL (ref 5.9–8.4)
PROT UR STRIP-MCNC: ABNORMAL MG/DL
QRS AXIS: 70 DEGREES
QRSD INTERVAL: 80 MS
QT INTERVAL: 360 MS
QTC INTERVAL: 445 MS
RBC # BLD AUTO: 5.22 MILLION/UL (ref 3.88–5.62)
RBC #/AREA URNS AUTO: ABNORMAL /HPF
RBC MORPH BLD: NORMAL
RSV RNA RESP QL NAA+PROBE: NEGATIVE
SARS-COV-2 RNA RESP QL NAA+PROBE: NEGATIVE
SODIUM SERPL-SCNC: 141 MMOL/L (ref 137–147)
SODIUM SERPL-SCNC: 142 MMOL/L (ref 137–147)
SP GR UR STRIP.AUTO: 1.02 (ref 1–1.04)
T WAVE AXIS: 73 DEGREES
UROBILINOGEN UA: NEGATIVE MG/DL
VENTRICULAR RATE: 92 BPM
WBC # BLD AUTO: 18.45 THOUSAND/UL (ref 4.31–10.16)
WBC #/AREA URNS AUTO: ABNORMAL /HPF

## 2022-04-05 PROCEDURE — 80053 COMPREHEN METABOLIC PANEL: CPT | Performed by: PHYSICIAN ASSISTANT

## 2022-04-05 PROCEDURE — 0241U HB NFCT DS VIR RESP RNA 4 TRGT: CPT | Performed by: PHYSICIAN ASSISTANT

## 2022-04-05 PROCEDURE — 93010 ELECTROCARDIOGRAM REPORT: CPT

## 2022-04-05 PROCEDURE — 82948 REAGENT STRIP/BLOOD GLUCOSE: CPT

## 2022-04-05 PROCEDURE — 99220 PR INITIAL OBSERVATION CARE/DAY 70 MINUTES: CPT | Performed by: INTERNAL MEDICINE

## 2022-04-05 PROCEDURE — 82010 KETONE BODYS QUAN: CPT | Performed by: PHYSICIAN ASSISTANT

## 2022-04-05 PROCEDURE — 96361 HYDRATE IV INFUSION ADD-ON: CPT

## 2022-04-05 PROCEDURE — 84484 ASSAY OF TROPONIN QUANT: CPT | Performed by: PHYSICIAN ASSISTANT

## 2022-04-05 PROCEDURE — 93005 ELECTROCARDIOGRAM TRACING: CPT

## 2022-04-05 PROCEDURE — 81001 URINALYSIS AUTO W/SCOPE: CPT | Performed by: PHYSICIAN ASSISTANT

## 2022-04-05 PROCEDURE — 36415 COLL VENOUS BLD VENIPUNCTURE: CPT | Performed by: PHYSICIAN ASSISTANT

## 2022-04-05 PROCEDURE — 85027 COMPLETE CBC AUTOMATED: CPT | Performed by: PHYSICIAN ASSISTANT

## 2022-04-05 PROCEDURE — 85007 BL SMEAR W/DIFF WBC COUNT: CPT | Performed by: PHYSICIAN ASSISTANT

## 2022-04-05 PROCEDURE — 99285 EMERGENCY DEPT VISIT HI MDM: CPT

## 2022-04-05 PROCEDURE — 71045 X-RAY EXAM CHEST 1 VIEW: CPT

## 2022-04-05 PROCEDURE — 82805 BLOOD GASES W/O2 SATURATION: CPT | Performed by: PHYSICIAN ASSISTANT

## 2022-04-05 PROCEDURE — 96374 THER/PROPH/DIAG INJ IV PUSH: CPT

## 2022-04-05 PROCEDURE — 99285 EMERGENCY DEPT VISIT HI MDM: CPT | Performed by: PHYSICIAN ASSISTANT

## 2022-04-05 PROCEDURE — 74177 CT ABD & PELVIS W/CONTRAST: CPT

## 2022-04-05 PROCEDURE — 83690 ASSAY OF LIPASE: CPT | Performed by: PHYSICIAN ASSISTANT

## 2022-04-05 PROCEDURE — 85025 COMPLETE CBC W/AUTO DIFF WBC: CPT | Performed by: PHYSICIAN ASSISTANT

## 2022-04-05 PROCEDURE — G1004 CDSM NDSC: HCPCS

## 2022-04-05 PROCEDURE — 96375 TX/PRO/DX INJ NEW DRUG ADDON: CPT

## 2022-04-05 RX ORDER — CEFTRIAXONE 1 G/50ML
1000 INJECTION, SOLUTION INTRAVENOUS EVERY 24 HOURS
Status: DISCONTINUED | OUTPATIENT
Start: 2022-04-05 | End: 2022-04-06 | Stop reason: HOSPADM

## 2022-04-05 RX ORDER — INSULIN GLARGINE 100 [IU]/ML
24 INJECTION, SOLUTION SUBCUTANEOUS
Status: DISCONTINUED | OUTPATIENT
Start: 2022-04-05 | End: 2022-04-06 | Stop reason: HOSPADM

## 2022-04-05 RX ORDER — SODIUM CHLORIDE 9 MG/ML
75 INJECTION, SOLUTION INTRAVENOUS CONTINUOUS
Status: DISCONTINUED | OUTPATIENT
Start: 2022-04-05 | End: 2022-04-06

## 2022-04-05 RX ORDER — SODIUM CHLORIDE 9 MG/ML
125 INJECTION, SOLUTION INTRAVENOUS CONTINUOUS
Status: DISCONTINUED | OUTPATIENT
Start: 2022-04-05 | End: 2022-04-05

## 2022-04-05 RX ORDER — INSULIN GLARGINE 100 [IU]/ML
20 INJECTION, SOLUTION SUBCUTANEOUS
Status: DISCONTINUED | OUTPATIENT
Start: 2022-04-05 | End: 2022-04-05

## 2022-04-05 RX ORDER — ONDANSETRON 2 MG/ML
4 INJECTION INTRAMUSCULAR; INTRAVENOUS EVERY 6 HOURS PRN
Status: DISCONTINUED | OUTPATIENT
Start: 2022-04-05 | End: 2022-04-06 | Stop reason: HOSPADM

## 2022-04-05 RX ORDER — ONDANSETRON 2 MG/ML
4 INJECTION INTRAMUSCULAR; INTRAVENOUS ONCE
Status: COMPLETED | OUTPATIENT
Start: 2022-04-05 | End: 2022-04-05

## 2022-04-05 RX ORDER — INSULIN ASPART 100 [IU]/ML
3 INJECTION, SUSPENSION SUBCUTANEOUS
Status: DISCONTINUED | OUTPATIENT
Start: 2022-04-05 | End: 2022-04-05

## 2022-04-05 RX ORDER — ACETAMINOPHEN 325 MG/1
650 TABLET ORAL EVERY 4 HOURS PRN
Status: DISCONTINUED | OUTPATIENT
Start: 2022-04-05 | End: 2022-04-06 | Stop reason: HOSPADM

## 2022-04-05 RX ADMIN — SODIUM CHLORIDE 75 ML/HR: 0.9 INJECTION, SOLUTION INTRAVENOUS at 19:19

## 2022-04-05 RX ADMIN — ONDANSETRON 4 MG: 2 INJECTION INTRAMUSCULAR; INTRAVENOUS at 14:08

## 2022-04-05 RX ADMIN — INSULIN HUMAN 7 UNITS: 100 INJECTION, SOLUTION PARENTERAL at 15:43

## 2022-04-05 RX ADMIN — INSULIN LISPRO 1 UNITS: 100 INJECTION, SOLUTION INTRAVENOUS; SUBCUTANEOUS at 19:31

## 2022-04-05 RX ADMIN — SODIUM CHLORIDE 1000 ML: 0.9 INJECTION, SOLUTION INTRAVENOUS at 14:08

## 2022-04-05 RX ADMIN — ONDANSETRON 4 MG: 2 INJECTION INTRAMUSCULAR; INTRAVENOUS at 20:35

## 2022-04-05 RX ADMIN — IOHEXOL 100 ML: 350 INJECTION, SOLUTION INTRAVENOUS at 15:07

## 2022-04-05 RX ADMIN — INSULIN LISPRO 1 UNITS: 100 INJECTION, SOLUTION INTRAVENOUS; SUBCUTANEOUS at 21:40

## 2022-04-05 RX ADMIN — CEFTRIAXONE 1000 MG: 1 INJECTION, SOLUTION INTRAVENOUS at 19:19

## 2022-04-05 RX ADMIN — INSULIN GLARGINE 24 UNITS: 100 INJECTION, SOLUTION SUBCUTANEOUS at 21:40

## 2022-04-05 NOTE — ED PROVIDER NOTES
History  Chief Complaint   Patient presents with    Vomiting     vomiting since Sunday night     66 y/o diabetic male presents for evaluation of nausea and vomiting x 3 days  Patient reports his blood sugar has been elevated however reports no other associated symptoms or ill contacts  Patient states he has had no fevers, chills, abd pain, diarrhea, chest pain, coughing or sick contacts however is NOT Vaccinated for COVID or the flu  Patient reports he has not taken any medications with the exception of insulin  Patient reports no other medical conditions besides diabetes  History provided by:  Patient   used: No    Vomiting  Severity:  Moderate  Duration:  3 days  Timing:  Constant  Quality:  Stomach contents  Progression:  Unchanged  Chronicity:  New  Recent urination:  Normal  Relieved by:  None tried  Worsened by:  Nothing  Ineffective treatments:  None tried  Associated symptoms: no abdominal pain, no chills, no fever and no sore throat        Prior to Admission Medications   Prescriptions Last Dose Informant Patient Reported? Taking? Continuous Blood Gluc  (FreeStyle Josiah 14 Day [place]) DUNIA   No No   Sig: Use 1 each 4 (four) times a day   Continuous Blood Gluc Sensor (FreeStyle Josiah 14 Day Sensor) MISC   No No   Sig: Use 1 each 4 (four) times a day   Empagliflozin (Jardiance) 10 MG TABS   No No   Sig: Take 1 tablet (10 mg total) by mouth every morning   Patient not taking: Reported on 1/27/2022    Lancets (freestyle) lancets   No No   Sig: Use as instructed   ammonium lactate (LAC-HYDRIN) 12 % lotion   No No   Sig: Apply to dry skin twice daily as needed or when changing dressings     ergocalciferol (VITAMIN D2) 50,000 units   No No   Sig: Take 1 capsule (50,000 Units total) by mouth once a week   Patient not taking: Reported on 8/11/2021   ferrous sulfate 325 (65 Fe) mg tablet   No No   Sig: Take 1 tablet (325 mg total) by mouth 2 (two) times a day with meals   Patient not taking: Reported on 8/11/2021   glucose blood (FREESTYLE LITE) test strip   No No   Sig: Use as instructed   glucose monitoring kit (FREESTYLE) monitoring kit   No No   Sig: Use 1 each 2 (two) times a day   insulin glargine (LANTUS) 100 units/mL subcutaneous injection   Yes No   Sig: Inject 20 Units under the skin daily at bedtime   insulin lispro protamine-insulin lispro (HumaLOG 75/25) 100 units/mL   Yes No   Sig: Inject 7 Units under the skin 3 (three) times a day Before meals   mupirocin (BACTROBAN) 2 % ointment   No No   Sig: Apply topically daily   sitaGLIPtin-metFORMIN (JANUMET)  MG per tablet   No No   Sig: Take 1 tablet by mouth 2 (two) times a day with meals   Patient not taking: Reported on 1/27/2022       Facility-Administered Medications: None       Past Medical History:   Diagnosis Date    Diabetes mellitus ([facility] [address] )     Diarrhea        History reviewed  No pertinent surgical history  Family History   Problem Relation Age of Onset    Diabetes Mother     Cancer Mother     Thyroid disease Mother     Diabetes Father     Cancer Father     Hypertension Sister      I have reviewed and agree with the history as documented  E-Cigarette/Vaping    E-Cigarette Use Never User      E-Cigarette/Vaping Substances    Nicotine No     THC No     CBD No     Flavoring No     Other No     Unknown No      Social History     Tobacco Use    Smoking status: Never Smoker    Smokeless tobacco: Never Used   Vaping Use    Vaping Use: Never used   Substance Use Topics    Alcohol use: Never    Drug use: Never       Review of Systems   Constitutional: Negative for chills, fatigue and fever  HENT: Negative for congestion, ear pain, rhinorrhea and sore throat  Eyes: Negative for redness  Respiratory: Negative for chest tightness and shortness of breath  Cardiovascular: Negative for chest pain and palpitations  Gastrointestinal: Positive for nausea and vomiting  Negative for abdominal pain  Genitourinary: Negative for dysuria and hematuria  Musculoskeletal: Negative  Skin: Negative for rash  Neurological: Negative for dizziness, syncope, light-headedness and numbness  Physical Exam  Physical Exam  Vitals and nursing note reviewed  Constitutional:       Appearance: Normal appearance  He is well-developed  HENT:      Head: Normocephalic and atraumatic  Eyes:      General: No scleral icterus  Pupils: Pupils are equal, round, and reactive to light  Cardiovascular:      Rate and Rhythm: Normal rate and regular rhythm  Pulses: Normal pulses  Pulmonary:      Effort: Pulmonary effort is normal  No respiratory distress  Breath sounds: No stridor  Abdominal:      General: There is no distension  Palpations: Abdomen is soft  There is no mass  Tenderness: There is no abdominal tenderness  Musculoskeletal:      Cervical back: Normal range of motion  Skin:     General: Skin is warm and dry  Capillary Refill: Capillary refill takes less than 2 seconds  Coloration: Skin is not jaundiced  Neurological:      Mental Status: He is alert and oriented to person, place, and time        Gait: Gait normal    Psychiatric:         Mood and Affect: Mood normal          Vital Signs  ED Triage Vitals   Temperature Pulse Respirations Blood Pressure SpO2   04/05/22 1321 04/05/22 1321 04/05/22 1321 04/05/22 1322 04/05/22 1321   98 4 °F (36 9 °C) 98 18 101/55 99 %      Temp Source Heart Rate Source Patient Position - Orthostatic VS BP Location FiO2 (%)   04/05/22 1321 04/05/22 1321 04/05/22 1321 04/05/22 1321 --   Tympanic Monitor Sitting Left arm       Pain Score       04/05/22 1457       No Pain           Vitals:    04/05/22 1321 04/05/22 1322 04/05/22 1457 04/05/22 1547   BP:  101/55 152/80 151/82   Pulse: 98  92 91   Patient Position - Orthostatic VS: Sitting  Lying Lying         Visual Acuity      ED Medications  Medications   insulin glargine (LANTUS) subcutaneous injection 20 Units 0 2 mL (has no administration in time range)   insulin aspart protamine-insulin aspart (NovoLOG 70/30) 100 units/mL subcutaneous injection 3 Units (has no administration in time range)   acetaminophen (TYLENOL) tablet 650 mg (has no administration in time range)   ondansetron (ZOFRAN) injection 4 mg (has no administration in time range)   sodium chloride 0 9 % infusion (has no administration in time range)   enoxaparin (LOVENOX) subcutaneous injection 40 mg (has no administration in time range)   cefTRIAXone (ROCEPHIN) IVPB (premix in dextrose) 1,000 mg 50 mL (has no administration in time range)   sodium chloride 0 9 % bolus 1,000 mL (0 mL Intravenous Stopped 4/5/22 1547)   ondansetron (ZOFRAN) injection 4 mg (4 mg Intravenous Given 4/5/22 1408)   iohexol (OMNIPAQUE) 350 MG/ML injection (SINGLE-DOSE) 100 mL (100 mL Intravenous Given 4/5/22 1507)   insulin regular (HumuLIN R,NovoLIN R) injection 7 Units (7 Units Intravenous Given 4/5/22 1543)       Diagnostic Studies  Results Reviewed     Procedure Component Value Units Date/Time    HS Troponin I 4hr [[id number]]  (Normal) Collected: 04/05/22 1643    Lab Status: Final result Specimen: Blood from Arm, Right Updated: 04/05/22 1712     hs TnI 4hr 6 ng/L      Delta 4hr hsTnI -1 ng/L     Comprehensive metabolic panel [[id number]]  (Abnormal) Collected: 04/05/22 1643    Lab Status: Final result Specimen: Blood from Arm, Right Updated: 04/05/22 1702     Sodium 142 mmol/L      Potassium 3 6 mmol/L      Chloride 106 mmol/L      CO2 24 mmol/L      ANION GAP 12 mmol/L      BUN 13 mg/dL      Creatinine 0 71 mg/dL      Glucose 205 mg/dL      Calcium 8 4 mg/dL      AST 30 U/L      ALT 14 U/L      Alkaline Phosphatase 82 U/L      Total Protein 6 7 g/dL      Albumin 3 5 g/dL      Total Bilirubin 0 72 mg/dL      eGFR 101 ml/min/1 73sq m     Narrative:      [place] guidelines for Chronic Kidney Disease (CKD):     Stage 1 with normal or high GFR (GFR > 90 mL/min/1 73 square meters)    Stage 2 Mild CKD (GFR = 60-89 mL/min/1 73 square meters)    Stage 3A Moderate CKD (GFR = 45-59 mL/min/1 73 square meters)    Stage 3B Moderate CKD (GFR = 30-44 mL/min/1 73 square meters)    Stage 4 Severe CKD (GFR = 15-29 mL/min/1 73 square meters)    Stage 5 End Stage CKD (GFR <15 mL/min/1 73 square meters)  Note: GFR calculation is accurate only with a steady state creatinine    Beta Hydroxybutyrate [[id number]]  (Abnormal) Collected: 04/05/22 1643    Lab Status: Final result Specimen: Blood from Arm, Right Updated: 04/05/22 1652     BETA-HYDROXYBUTYRATE 4 4 mmol/L     Fingerstick Glucose (POCT) [[id number]]  (Abnormal) Collected: 04/05/22 1631    Lab Status: Final result Updated: 04/05/22 1632     POC Glucose 219 mg/dl     Fingerstick Glucose (POCT) [[id number]]  (Abnormal) Collected: 04/05/22 1553    Lab Status: Final result Updated: 04/05/22 1555     POC Glucose 273 mg/dl     HS Troponin I 2hr [[id number]]  (Normal) Collected: 04/05/22 1500    Lab Status: Final result Specimen: Blood from Arm, Right Updated: 04/05/22 1531     hs TnI 2hr 6 ng/L      Delta 2hr hsTnI -1 ng/L     Manual Differential(PHLEBS Do Not Order) [[id number]]  (Abnormal) Collected: 04/05/22 1408    Lab Status: Final result Specimen: Blood from Arm, Right Updated: 04/05/22 1530     Segmented % 94 %      Bands % 1 %      Lymphocytes % 5 %      Monocytes % 0 %      Eosinophils, % 0 %      Basophils % 0 %      Absolute Neutrophils 17 53 Thousand/uL      Lymphocytes Absolute 0 92 Thousand/uL      Monocytes Absolute 0 00 Thousand/uL      Eosinophils Absolute 0 00 Thousand/uL      Basophils Absolute 0 00 Thousand/uL      Total Counted --     RBC Morphology Normal     Platelet Estimate Adequate    Urine Microscopic [[id number]]  (Abnormal) Collected: 04/05/22 1408    Lab Status: Final result Specimen: Urine, Other Updated: 04/05/22 1518     RBC, UA None Seen /hpf      WBC, UA 30-50 /hpf      Epithelial Cells Occasional /hpf      Bacteria, UA Moderate /hpf      Hyaline Casts, UA 2-4 /lpf     Beta Hydroxybutyrate [[id number]]  (Abnormal) Collected: 04/05/22 1500    Lab Status: Final result Specimen: Blood from Arm, Right Updated: 04/05/22 1516     BETA-HYDROXYBUTYRATE 6 8 mmol/L     Blood gas, venous [[id number]]  (Abnormal) Collected: 04/05/22 1500    Lab Status: Final result Specimen: Blood from Arm, Right Updated: 04/05/22 1514     pH, Jimbo 7 317     pCO2, Jimbo 39 9 mm Hg      pO2, Jimbo 42 3 mm Hg      HCO3, Jimbo 20 0 mmol/L      Base Excess, Jimbo -5 7 mmol/L      O2 Content, Jimbo 13 4 ml/dL      O2 HGB, VENOUS 76 0 %     COVID/FLU/RSV - 2 hour TAT [[id number]]  (Normal) Collected: 04/05/22 1408    Lab Status: Final result Specimen: Nares from Nose Updated: 04/05/22 1503     SARS-CoV-2 Negative     INFLUENZA A PCR Negative     INFLUENZA B PCR Negative     RSV PCR Negative    Narrative:      FOR PEDIATRIC PATIENTS - copy/paste COVID Guidelines URL to browser: [email or web address]    SARS-CoV-2 assay is a Nucleic Acid Amplification assay intended for the  qualitative detection of nucleic acid from SARS-CoV-2 in nasopharyngeal  swabs  Results are for the presumptive identification of SARS-CoV-2 RNA  Positive results are indicative of infection with SARS-CoV-2, the virus  causing COVID-19, but do not rule out bacterial infection or co-infection  with other viruses  Laboratories within the [place] and its  territories are required to report all positive results to the appropriate  public health authorities  Negative results do not preclude SARS-CoV-2  infection and should not be used as the sole basis for treatment or other  patient management decisions  Negative results must be combined with  clinical observations, patient history, and epidemiological information  This test has not been FDA cleared or approved      This test has been authorized by FDA under an Emergency Use Authorization  (EUA)  This test is only authorized for the duration of time the  declaration that circumstances exist justifying the authorization of the  emergency use of an in vitro diagnostic tests for detection of SARS-CoV-2  virus and/or diagnosis of COVID-19 infection under section 564(b)(1) of  the Act, 21 U  S C  574TEJ-6(Y)(7), unless the authorization is terminated  or revoked sooner  The test has been validated but independent review by FDA  and CLIA is pending  Test performed using [employer] Gene[employer]pert: This RT-PCR assay targets N2,  a region unique to SARS-CoV-2   A conserved region in the E-gene was chosen  for pan-Sarbecovirus detection which includes SARS-CoV-2     UA (URINE) with reflex to Scope [[id number]]  (Abnormal) Collected: 04/05/22 1408    Lab Status: Final result Specimen: Urine, Other Updated: 04/05/22 1446     Color, UA Straw     Clarity, UA Clear     Specific Gravity, UA 1 025     pH, UA 6 0     Leukocytes,  0     Nitrite, UA Negative     Protein, UA 15 (Trace) mg/dl      Glucose, UA Negative mg/dl      Ketones, UA >=150 (3+) mg/dl      Bilirubin, UA Negative     Blood, UA Negative     UROBILINOGEN UA Negative mg/dL     HS Troponin 0hr (reflex protocol) [[id number]]  (Normal) Collected: 04/05/22 1408    Lab Status: Final result Specimen: Blood from Arm, Right Updated: 04/05/22 1445     hs TnI 0hr 7 ng/L     Lipase [[id number]]  (Abnormal) Collected: 04/05/22 1408    Lab Status: Final result Specimen: Blood from Arm, Right Updated: 04/05/22 1440     Lipase 16 u/L     Comprehensive metabolic panel [[id number]]  (Abnormal) Collected: 04/05/22 1408    Lab Status: Final result Specimen: Blood from Arm, Right Updated: 04/05/22 1440     Sodium 141 mmol/L      Potassium 4 2 mmol/L      Chloride 103 mmol/L      CO2 23 mmol/L      ANION GAP 15 mmol/L      BUN 14 mg/dL      Creatinine 0 77 mg/dL      Glucose 273 mg/dL      Calcium 8 8 mg/dL      AST 33 U/L      ALT 15 U/L Alkaline Phosphatase 95 U/L      Total Protein 7 1 g/dL      Albumin 3 9 g/dL      Total Bilirubin 0 87 mg/dL      eGFR 97 ml/min/1 73sq m     Narrative:      [employer] guidelines for Chronic Kidney Disease (CKD):     Stage 1 with normal or high GFR (GFR > 90 mL/min/1 73 square meters)    Stage 2 Mild CKD (GFR = 60-89 mL/min/1 73 square meters)    Stage 3A Moderate CKD (GFR = 45-59 mL/min/1 73 square meters)    Stage 3B Moderate CKD (GFR = 30-44 mL/min/1 73 square meters)    Stage 4 Severe CKD (GFR = 15-29 mL/min/1 73 square meters)    Stage 5 End Stage CKD (GFR <15 mL/min/1 73 square meters)  Note: GFR calculation is accurate only with a steady state creatinine    CBC and differential [[id number]]  (Abnormal) Collected: 04/05/22 1408    Lab Status: Final result Specimen: Blood from Arm, Right Updated: 04/05/22 1434     WBC 18 45 Thousand/uL      RBC 5 22 Million/uL      Hemoglobin 12 4 g/dL      Hematocrit 41 6 %      MCV 80 fL      MCH 23 8 pg      MCHC 29 8 g/dL      RDW 18 9 %      MPV 9 7 fL      Platelets 953 Thousands/uL     Narrative: This is an appended report  These results have been appended to a previously verified report  Fingerstick Glucose (POCT) [[id number]]  (Abnormal) Collected: 04/05/22 1413    Lab Status: Final result Updated: 04/05/22 1420     POC Glucose 278 mg/dl                  XR chest portable   Final Result by Jose Manuel Thurston MD (04/05 1700)      No acute cardiopulmonary disease  Workstation performed: [id number]         CT abdomen pelvis w contrast   Final Result by Emma Mooney MD (04/05 0494)      1  Esophageal wall thickening, with mucosal hyperenhancement, and surrounding fluid in the posterior mediastinum, may represent esophagitis  Please note that this is only partially visualized  Recommend chest CT with IV contrast for further    evaluation  2   Trace left pleural effusion   3  T11 compression deformity  Workstation performed: [id number]                    Procedures  Procedures         ED Course  ED Course as of 04/05/22 1713   Tue Apr 05, 2022   1425 WBC(!): 18 45   1450 Anion Gap(!): 15   1450 Ketones, UA(!): >=150 (3+)   1523 BETA-HYDROXYBUTYRATE(!): 6 8   1523 Bacteria, UA(!): Moderate   1523 Anion Gap(!): 15   1551   IMPRESSION:     1   Esophageal wall thickening, with mucosal hyperenhancement, and surrounding fluid in the posterior mediastinum, may represent esophagitis  Please note that this is only partially visualized  Recommend chest CT with IV contrast for further   evaluation  2   Trace left pleural effusion  3  T11 compression deformity          1711 Dr Bradley Never accepted for admission obs, due to improved beta hydroxy and anion gap closed  1713 BETA-HYDROXYBUTYRATE(!): 4 4   1713 Anion Gap: 12                               SBIRT 20yo+      Most Recent Value   SBIRT (25 yo +)    In order to provide better care to our patients, we are screening all of our patients for alcohol and drug use  Would it be okay to ask you these screening questions? No Filed at: 04/05/2022 1418                    MDM  Number of Diagnoses or Management Options  Compression fracture of T11 vertebra, initial encounter (HCC)  Hyperglycemia  Nausea and vomiting  UTI (urinary tract infection)  Diagnosis management comments: All imaging and/or lab testing discussed with patient  Patient and/or family members verbalizes understanding and agrees with plan for admission  Patient is stable for admission      Portions of the record may have been created with voice recognition software  Occasional wrong word or "sound a like" substitutions may have occurred due to the inherent limitations of voice recognition software  Read the chart carefully and recognize, using context, where substitutions have occurred              Disposition  Final diagnoses:   Nausea and vomiting   Hyperglycemia   UTI (urinary tract infection)   Compression fracture of T11 vertebra, initial encounter [facility])     Time reflects when diagnosis was documented in both MDM as applicable and the Disposition within this note     Time User Action Codes Description Comment    4/5/2022  5:12 PM Orin Alvarez [R11 2] Nausea and vomiting     4/5/2022  5:12 PM Michell Lady Add [R73 9] Hyperglycemia     4/5/2022  5:12 PM Michell Lady Add [N39 0] UTI (urinary tract infection)     4/5/2022  5:12 PM Orin Alvarez [Z25 610V] Compression fracture of T11 vertebra, initial encounter [facility])       ED Disposition     ED Disposition Condition Date/Time Comment    Admit Stable Tue Apr 5, 2022  5:11 PM Case was discussed with Dr Garcia and the patient's admission status was agreed to be Admission Status: observation status to the service of Dr Nona Finley   Follow-up Information    None         Patient's Medications   Discharge Prescriptions    No medications on file       No discharge procedures on file      PDMP Review     None          ED Provider  Electronically Signed by           Jayden Ambrose PA-C  04/05/22 2267

## 2022-04-05 NOTE — ASSESSMENT & PLAN NOTE
· Patient presents with mild hyperglycemia however initially with an anion gap and elevated beta hydroxybutyrate concerning for possible DKA  · Responded well to IV fluid hydration and insulin in the ED  · Anion gap closed  · Continue home Lantus 20 units daily at bedtime and NovoLog 70/30 TID AC  · Sliding scale insulin while inpatient  · IV fluid hydration  · Carbohydrate controlled diet

## 2022-04-05 NOTE — ASSESSMENT & PLAN NOTE
· Patient presents with complaints of nausea and vomiting  · Evidence of cystitis on urinalysis  · Ceftriaxone 1 g q24 hours x3 days   · Follow-up urine cultures  · Will deescalate antibiotics as appropriate  · Assign to observation

## 2022-04-05 NOTE — H&P
[facility]  H&P- Van Potts 1960, 65 y o  male MRN: [id number]  Unit/Bed#: ED 21 Encounter: [id number]  Primary Care Provider: Christ Cardenas MD   Date and time admitted to hospital: 4/5/2022  1:24 PM    * UTI (urinary tract infection)  Assessment & Plan  · Patient presents with complaints of nausea and vomiting  · Evidence of cystitis on urinalysis  · Ceftriaxone 1 g q24 hours x3 days   · Follow-up urine cultures  · Will deescalate antibiotics as appropriate  · Assign to observation    Hyperglycemia  Assessment & Plan  · Patient presents with mild hyperglycemia however initially with an anion gap and elevated beta hydroxybutyrate concerning for possible DKA  · Responded well to IV fluid hydration and insulin in the ED  · Anion gap closed  · Continue home Lantus 20 units daily at bedtime and NovoLog 70/30 TID AC  · Sliding scale insulin while inpatient  · IV fluid hydration  · Carbohydrate controlled diet    Leukocytosis  Assessment & Plan  · Likely secondary to acute cystitis  · IV antibiotics as above  · Trend CBC      VTE Prophylaxis:  Lovenox  Code Status: Level 1 Full Code    Anticipated Length of Stay:  Patient will be admitted on an Observation basis with an anticipated length of stay of  2 midnights  Justification for Hospital Stay:  Cystitis    Total Time for Visit, including Counseling / Coordination of Care: 60 minutes  Greater than 50% of this total time spent on direct patient counseling and coordination of care  Chief Complaint:   Nausea/vomiting    History of Present Illness:    Van Potts is a 65 y o  male with a past medical history significant for insulin-dependent type 2 diabetes mellitus who presents with complaints of nausea and vomiting  He states that his blood glucose levels have been elevated at home  He has no other complaints at this time    In the ED, blood glucose levels were slightly elevated with a mild anion gap metabolic acidosis which resolved with IV fluids and insulin  Urinalysis remarkable for cystitis  The patient was assigned to observation for management of acute cystitis complicated by hyperglycemia  Review of Systems:    Review of Systems   Constitutional: Negative for chills and fever  HENT: Negative for ear pain and sore throat  Eyes: Negative for pain and visual disturbance  Respiratory: Negative for cough and shortness of breath  Cardiovascular: Negative for chest pain and palpitations  Gastrointestinal: Positive for nausea and vomiting  Negative for abdominal pain  Genitourinary: Negative for dysuria and hematuria  Musculoskeletal: Negative for arthralgias and back pain  Skin: Negative for color change and rash  Neurological: Negative for seizures and syncope  All other systems reviewed and are negative  Past Medical and Surgical History:     Past Medical History:   Diagnosis Date    Diabetes mellitus ([facility] [address] )     Diarrhea        History reviewed  No pertinent surgical history  Meds/Allergies:    Prior to Admission medications    Medication Sig Start Date End Date Taking? Authorizing Provider   ammonium lactate (LAC-HYDRIN) 12 % lotion Apply to dry skin twice daily as needed or when changing dressings   1/27/22   Shaka Becerril MD   Continuous Blood Gluc  [facility] 14 Day [place]) DUNIA Use 1 each 4 (four) times a day 7/29/21   Real Abel MD   Continuous Blood Gluc Sensor (FreeStyle Josiah 14 Day Sensor) MISC Use 1 each 4 (four) times a day 7/29/21   Real Abel MD   Empagliflozin (Jardiance) 10 MG TABS Take 1 tablet (10 mg total) by mouth every morning  Patient not taking: Reported on 1/27/2022 5/26/21   Real Abel MD   ergocalciferol (VITAMIN D2) 50,000 units Take 1 capsule (50,000 Units total) by mouth once a week  Patient not taking: Reported on 8/11/2021 5/3/21   Real Abel MD   ferrous sulfate 325 (65 Fe) mg tablet Take 1 tablet (325 mg total) by mouth 2 (two) times a day with meals  Patient not taking: Reported on 8/11/2021 4/27/21 10/24/21  Augustina Alonzo MD   glucose blood (FREESTYLE LITE) test strip Use as instructed 5/7/21   Augustina Alonzo MD   glucose monitoring kit (FREESTYLE) monitoring kit Use 1 each 2 (two) times a day 5/7/21   Augustina Alonzo MD   insulin glargine (LANTUS) 100 units/mL subcutaneous injection Inject 20 Units under the skin daily at bedtime    Historical Provider, MD   insulin lispro protamine-insulin lispro (HumaLOG 75/25) 100 units/mL Inject 7 Units under the skin 3 (three) times a day Before meals    Historical Provider, MD   Lancets (freestyle) lancets Use as instructed 5/7/21   Augustina Alonzo MD   mupirocin (BACTROBAN) 2 % ointment Apply topically daily 1/27/22   Ryann Becker MD   sitaGLIPtin-metFORMIN (JANUMET)  MG per tablet Take 1 tablet by mouth 2 (two) times a day with meals  Patient not taking: Reported on 1/27/2022 9/28/21 9/23/22  Augustina Alonzo MD       Allergies: Allergies   Allergen Reactions    Novocain [Procaine] Edema       Social History:     Marital Status: Single   Substance Use History:   Social History     Substance and Sexual Activity   Alcohol Use Never     Social History     Tobacco Use   Smoking Status Never Smoker   Smokeless Tobacco Never Used     Social History     Substance and Sexual Activity   Drug Use Never       Family History:    Pertinent family history reviewed    Physical Exam:     Vitals:   Blood Pressure: 151/82 (04/05/22 1547)  Pulse: 91 (04/05/22 1547)  Temperature: 98 4 °F (36 9 °C) (04/05/22 1321)  Temp Source: Tympanic (04/05/22 1321)  Respirations: 16 (04/05/22 1547)  Weight - Scale: 71 kg (156 lb 8 4 oz) (04/05/22 1321)  SpO2: 98 % (04/05/22 1547)    Physical Exam  Vitals and nursing note reviewed  Constitutional:       Appearance: He is well-developed  HENT:      Head: Normocephalic and atraumatic     Eyes:      Conjunctiva/sclera: Conjunctivae normal    Cardiovascular:      Rate and Rhythm: Normal rate and regular rhythm  Heart sounds: No murmur heard  Pulmonary:      Effort: Pulmonary effort is normal  No respiratory distress  Breath sounds: Normal breath sounds  Abdominal:      Palpations: Abdomen is soft  Tenderness: There is no abdominal tenderness  Musculoskeletal:      Cervical back: Neck supple  Skin:     General: Skin is warm and dry  Neurological:      Mental Status: He is alert  Additional Data:     Lab Results: I have reviewed pertinent results     Results from last 7 days   Lab Units 04/05/22  1408   WBC Thousand/uL 18 45*   HEMOGLOBIN g/dL 12 4   HEMATOCRIT % 41 6   PLATELETS Thousands/uL 270   BANDS PCT % 1   LYMPHO PCT % 5*   MONO PCT % 0*   EOS PCT % 0     Results from last 7 days   Lab Units 04/05/22  1643   SODIUM mmol/L 142   POTASSIUM mmol/L 3 6   CHLORIDE mmol/L 106   CO2 mmol/L 24   BUN mg/dL 13   CREATININE mg/dL 0 71   ANION GAP mmol/L 12   CALCIUM mg/dL 8 4   ALBUMIN g/dL 3 5   TOTAL BILIRUBIN mg/dL 0 72   ALK PHOS U/L 82   ALT U/L 14   AST U/L 30   GLUCOSE RANDOM mg/dL 205*         Results from last 7 days   Lab Units 04/05/22  1631 04/05/22  1553 04/05/22  1413   POC GLUCOSE mg/dl 219* 273* 278*               Imaging: I have reviewed pertinent imaging     XR chest portable   Final Result by Jhonathan Gray MD (04/05 1700)      No acute cardiopulmonary disease  Workstation performed: [id number]         CT abdomen pelvis w contrast   Final Result by Corina Saleh MD (04/05 7667)      1  Esophageal wall thickening, with mucosal hyperenhancement, and surrounding fluid in the posterior mediastinum, may represent esophagitis  Please note that this is only partially visualized  Recommend chest CT with IV contrast for further    evaluation  2   Trace left pleural effusion   3  T11 compression deformity              Workstation performed: [id number]             EKG, Pathology, and Other Studies Reviewed on Admission: · EKG: Reviewed     Allscripts / Epic Records Reviewed    ** Please Note: This note has been constructed using a voice recognition system   **

## 2022-04-06 VITALS
SYSTOLIC BLOOD PRESSURE: 151 MMHG | HEIGHT: 71 IN | TEMPERATURE: 97.6 F | DIASTOLIC BLOOD PRESSURE: 80 MMHG | HEART RATE: 87 BPM | RESPIRATION RATE: 18 BRPM | WEIGHT: 156.53 LBS | BODY MASS INDEX: 21.91 KG/M2 | OXYGEN SATURATION: 96 %

## 2022-04-06 LAB
ANION GAP SERPL CALCULATED.3IONS-SCNC: 5 MMOL/L (ref 5–14)
BASOPHILS # BLD AUTO: 0.05 THOUSANDS/ΜL (ref 0–0.1)
BASOPHILS NFR BLD AUTO: 0 % (ref 0–1)
BUN SERPL-MCNC: 10 MG/DL (ref 5–25)
CALCIUM SERPL-MCNC: 8.2 MG/DL (ref 8.4–10.2)
CHLORIDE SERPL-SCNC: 104 MMOL/L (ref 97–108)
CO2 SERPL-SCNC: 28 MMOL/L (ref 22–30)
CREAT SERPL-MCNC: 0.56 MG/DL (ref 0.7–1.5)
EOSINOPHIL # BLD AUTO: 0.03 THOUSAND/ΜL (ref 0–0.61)
EOSINOPHIL NFR BLD AUTO: 0 % (ref 0–6)
ERYTHROCYTE [DISTWIDTH] IN BLOOD BY AUTOMATED COUNT: 18.6 % (ref 11.6–15.1)
GFR SERPL CREATININE-BSD FRML MDRD: 111 ML/MIN/1.73SQ M
GLUCOSE P FAST SERPL-MCNC: 161 MG/DL (ref 70–99)
GLUCOSE SERPL-MCNC: 160 MG/DL (ref 65–140)
GLUCOSE SERPL-MCNC: 161 MG/DL (ref 70–99)
HCT VFR BLD AUTO: 35.3 % (ref 36.5–49.3)
HGB BLD-MCNC: 10.6 G/DL (ref 12–17)
IMM GRANULOCYTES # BLD AUTO: 0.05 THOUSAND/UL (ref 0–0.2)
IMM GRANULOCYTES NFR BLD AUTO: 0 % (ref 0–2)
LYMPHOCYTES # BLD AUTO: 1.16 THOUSANDS/ΜL (ref 0.6–4.47)
LYMPHOCYTES NFR BLD AUTO: 10 % (ref 14–44)
MAGNESIUM SERPL-MCNC: 2 MG/DL (ref 1.6–2.3)
MCH RBC QN AUTO: 23.8 PG (ref 26.8–34.3)
MCHC RBC AUTO-ENTMCNC: 30 G/DL (ref 31.4–37.4)
MCV RBC AUTO: 79 FL (ref 82–98)
MONOCYTES # BLD AUTO: 0.73 THOUSAND/ΜL (ref 0.17–1.22)
MONOCYTES NFR BLD AUTO: 6 % (ref 4–12)
NEUTROPHILS # BLD AUTO: 9.41 THOUSANDS/ΜL (ref 1.85–7.62)
NEUTS SEG NFR BLD AUTO: 84 % (ref 43–75)
NRBC BLD AUTO-RTO: 0 /100 WBCS
PHOSPHATE SERPL-MCNC: 2.4 MG/DL (ref 2.5–4.8)
PLATELET # BLD AUTO: 224 THOUSANDS/UL (ref 149–390)
PMV BLD AUTO: 10 FL (ref 8.9–12.7)
POTASSIUM SERPL-SCNC: 3.4 MMOL/L (ref 3.6–5)
RBC # BLD AUTO: 4.46 MILLION/UL (ref 3.88–5.62)
SODIUM SERPL-SCNC: 137 MMOL/L (ref 137–147)
WBC # BLD AUTO: 11.43 THOUSAND/UL (ref 4.31–10.16)

## 2022-04-06 PROCEDURE — 82948 REAGENT STRIP/BLOOD GLUCOSE: CPT

## 2022-04-06 PROCEDURE — 84100 ASSAY OF PHOSPHORUS: CPT | Performed by: INTERNAL MEDICINE

## 2022-04-06 PROCEDURE — 85025 COMPLETE CBC W/AUTO DIFF WBC: CPT | Performed by: INTERNAL MEDICINE

## 2022-04-06 PROCEDURE — 83735 ASSAY OF MAGNESIUM: CPT | Performed by: INTERNAL MEDICINE

## 2022-04-06 PROCEDURE — 99217 PR OBSERVATION CARE DISCHARGE MANAGEMENT: CPT | Performed by: INTERNAL MEDICINE

## 2022-04-06 PROCEDURE — 80048 BASIC METABOLIC PNL TOTAL CA: CPT | Performed by: INTERNAL MEDICINE

## 2022-04-06 RX ORDER — AMOXICILLIN AND CLAVULANATE POTASSIUM 875; 125 MG/1; MG/1
1 TABLET, FILM COATED ORAL EVERY 12 HOURS SCHEDULED
Qty: 10 TABLET | Refills: 0 | Status: SHIPPED | OUTPATIENT
Start: 2022-04-06 | End: 2022-04-11

## 2022-04-06 RX ORDER — INSULIN GLARGINE 100 [IU]/ML
24 INJECTION, SOLUTION SUBCUTANEOUS
Qty: 10 ML | Refills: 0 | Status: SHIPPED | OUTPATIENT
Start: 2022-04-06

## 2022-04-06 RX ORDER — INSULIN LISPRO 100 [IU]/ML
10 INJECTION, SOLUTION INTRAVENOUS; SUBCUTANEOUS
COMMUNITY
Start: 2022-03-11

## 2022-04-06 RX ORDER — POTASSIUM CHLORIDE 20 MEQ/1
40 TABLET, EXTENDED RELEASE ORAL ONCE
Status: COMPLETED | OUTPATIENT
Start: 2022-04-06 | End: 2022-04-06

## 2022-04-06 RX ORDER — AMOXICILLIN 500 MG/1
TABLET, FILM COATED ORAL
COMMUNITY
Start: 2022-01-19

## 2022-04-06 RX ADMIN — INSULIN LISPRO 10 UNITS: 100 INJECTION, SOLUTION INTRAVENOUS; SUBCUTANEOUS at 08:27

## 2022-04-06 RX ADMIN — INSULIN LISPRO 1 UNITS: 100 INJECTION, SOLUTION INTRAVENOUS; SUBCUTANEOUS at 08:26

## 2022-04-06 RX ADMIN — ENOXAPARIN SODIUM 40 MG: 100 INJECTION SUBCUTANEOUS at 08:26

## 2022-04-06 RX ADMIN — POTASSIUM CHLORIDE 40 MEQ: 1500 TABLET, EXTENDED RELEASE ORAL at 08:26

## 2022-04-06 NOTE — UTILIZATION REVIEW
Initial Clinical Review    Admission: Date/Time/Statement:   Admission Orders (From admission, onward)     Ordered        04/05/22 1713  Place in Observation  Once                      Orders Placed This Encounter   Procedures    Place in Observation     Standing Status:   Standing     Number of Occurrences:   1     Order Specific Question:   Level of Care     Answer:   Med Surg [16]     ED Arrival Information     Expected Arrival Acuity    - 4/5/2022 12:52 Urgent         Means of arrival Escorted by Service Admission type    [facility] [place] ([address]) Hospitalist Urgent         Arrival complaint    nausea        Chief Complaint   Patient presents with    Vomiting     vomiting since [place] night       Initial Presentation: 65 y o  male who presented by EMS to [address] [facility] ED  Admitted in observation status for evaluation and treatment of UTI, hyperglycemia  PMHx: T2 DM  Presented w/ n/v, along with elevated BG readings at home  In ED BG elevated at 278, w/ anion gap metabolic acidosis which resolved w/ IVF & insulin  UA shows cystitis  WBC 18 45  Physical exam unremarkable  Plan: IV ABX, IVF, follow urine cultures, continue home lantus & novolog TID AC + SSI, Consistent Carbohydrate Diet  Trend labs, replete electrolytes as needed        ED Triage Vitals   Temperature Pulse Respirations Blood Pressure SpO2   04/05/22 1321 04/05/22 1321 04/05/22 1321 04/05/22 1322 04/05/22 1321   98 4 °F (36 9 °C) 98 18 101/55 99 %      Temp Source Heart Rate Source Patient Position - Orthostatic VS BP Location FiO2 (%)   04/05/22 1321 04/05/22 1321 04/05/22 1321 04/05/22 1321 --   Tympanic Monitor Sitting Left arm       Pain Score       04/05/22 1457       No Pain          Wt Readings from Last 1 Encounters:   04/05/22 71 kg (156 lb 8 4 oz)     Additional Vital Signs:   Date/Time Temp Pulse Resp BP MAP (mmHg) SpO2 O2 Device   04/06/22 0722 97 6 °F (36 4 °C) 87 18 151/80 -- 96 % None (Room air)   04/05/22 2348 98 6 °F (37 °C) 86 18 138/75 -- 95 % None (Room air)   04/05/22 1751 98 7 °F (37 1 °C) 94 18 141/72 86 99 % --   04/05/22 1730 -- 94 16 140/78 -- 98 % None (Room air)   04/05/22 1547 -- 91 16 151/82 -- 98 % None (Room air)   04/05/22 1457 -- 92 16 152/80 -- 99 % None (Room air)       Pertinent Labs/Diagnostic Test Results:   4/5 - EKG: NSR    XR chest portable   Final Result by Claudene Durham, MD (04/05 1700)      No acute cardiopulmonary disease  Workstation performed: [id number]         CT abdomen pelvis w contrast   Final Result by Stan Diamond MD (04/05 1547)      1  Esophageal wall thickening, with mucosal hyperenhancement, and surrounding fluid in the posterior mediastinum, may represent esophagitis  Please note that this is only partially visualized  Recommend chest CT with IV contrast for further    evaluation  2   Trace left pleural effusion   3  T11 compression deformity              Workstation performed: [id number]           Results from last 7 days   Lab Units 04/05/22  1408   SARS-COV-2  Negative     Results from last 7 days   Lab Units 04/06/22  0453 04/05/22  1408   WBC Thousand/uL 11 43* 18 45*   HEMOGLOBIN g/dL 10 6* 12 4   HEMATOCRIT % 35 3* 41 6   PLATELETS Thousands/uL 224 270   NEUTROS ABS Thousands/µL 9 41*  --    BANDS PCT %  --  1         Results from last 7 days   Lab Units 04/06/22  0453 04/05/22  1643 04/05/22  1408   SODIUM mmol/L 137 142 141   POTASSIUM mmol/L 3 4* 3 6 4 2   CHLORIDE mmol/L 104 106 103   CO2 mmol/L 28 24 23   ANION GAP mmol/L 5 12 15*   BUN mg/dL 10 13 14   CREATININE mg/dL 0 56* 0 71 0 77   EGFR ml/min/1 73sq m 111 101 97   CALCIUM mg/dL 8 2* 8 4 8 8   MAGNESIUM mg/dL 2 0  --   --    PHOSPHORUS mg/dL 2 4*  --   --      Results from last 7 days   Lab Units 04/05/22  1643 04/05/22  1408   AST U/L 30 33   ALT U/L 14 15   ALK PHOS U/L 82 95   TOTAL PROTEIN g/dL 6 7 7 1   ALBUMIN g/dL 3 5 3 9   TOTAL BILIRUBIN mg/dL 0 72 0 87     Results from last 7 days   Lab Units 04/06/22  0547 04/05/22  2030 04/05/22  1930 04/05/22  1631 04/05/22  1553 04/05/22  1413   POC GLUCOSE mg/dl 160* 205* 175* 219* 273* 278*     Results from last 7 days   Lab Units 04/06/22  0453 04/05/22  1643 04/05/22  1408   GLUCOSE RANDOM mg/dL 161* 205* 273*             BETA-HYDROXYBUTYRATE   Date Value Ref Range Status   04/05/2022 4 4 (H) <0 6 mmol/L Final          Results from last 7 days   Lab Units 04/05/22  1500   PH TONI  7 317   PCO2 TONI mm Hg 39 9*   PO2 TONI mm Hg 42 3   HCO3 TONI mmol/L 20 0*   BASE EXC TONI mmol/L -5 7   O2 CONTENT TONI ml/dL 13 4   O2 HGB, VENOUS % 76 0             Results from last 7 days   Lab Units 04/05/22  1643 04/05/22  1500 04/05/22  1408   HS TNI 0HR ng/L  --   --  7   HS TNI 2HR ng/L  --  6  --    HSTNI D2 ng/L  --  -1  --    HS TNI 4HR ng/L 6  --   --    HSTNI D4 ng/L -1  --   --                                              Results from last 7 days   Lab Units 04/05/22  1408   LIPASE u/L 16*                 Results from last 7 days   Lab Units 04/05/22  1408   CLARITY UA  Clear   COLOR UA  Straw   SPEC GRAV UA  1 025   PH UA  6 0   GLUCOSE UA mg/dl Negative   KETONES UA mg/dl >=150 (3+)*   BLOOD UA  Negative   PROTEIN UA mg/dl 15 (Trace)*   NITRITE UA  Negative   BILIRUBIN UA  Negative   UROBILINOGEN UA mg/dL Negative   LEUKOCYTES UA  500 0*   WBC UA /hpf 30-50*   RBC UA /hpf None Seen   BACTERIA UA /hpf Moderate*   EPITHELIAL CELLS WET PREP /hpf Occasional     Results from last 7 days   Lab Units 04/05/22  1408   INFLUENZA A PCR  Negative   INFLUENZA B PCR  Negative   RSV PCR  Negative                                           ED Treatment:   Medication Administration from 04/05/2022 1252 to 04/05/2022 1748       Date/Time Order Dose Route Action     04/05/2022 1408 sodium chloride 0 9 % bolus 1,000 mL 1,000 mL Intravenous New Bag     04/05/2022 1408 ondansetron (ZOFRAN) injection 4 mg 4 mg Intravenous Given     04/05/2022 1507 iohexol (OMNIPAQUE) 350 MG/ML injection (SINGLE-DOSE) 100 mL 100 mL Intravenous Given     04/05/2022 1543 insulin regular (HumuLIN R,NovoLIN R) injection 7 Units 7 Units Intravenous Given        Past Medical History:   Diagnosis Date    Diabetes mellitus ([facility] )     Diarrhea      Present on Admission:   UTI (urinary tract infection)   Hyperglycemia   Leukocytosis      Admitting Diagnosis: Vomiting [R11 10]  UTI (urinary tract infection) [N39 0]  Nausea and vomiting [R11 2]  Hyperglycemia [R73 9]  Compression fracture of T11 vertebra, initial encounter ([facility] ) [[id number]]  Age/Sex: 65 y o  male  Admission Orders:  Consistent Carbohydrate Diet  accuchecks ACHS  SCDs  Scheduled Medications:  cefTRIAXone, 1,000 mg, Intravenous, Q24H  enoxaparin, 40 mg, Subcutaneous, Daily  insulin glargine, 24 Units, Subcutaneous, HS  insulin lispro, 1-5 Units, Subcutaneous, TID AC  insulin lispro, 1-5 Units, Subcutaneous, HS  insulin lispro, 10 Units, Subcutaneous, TID With Meals  potassium chloride, 40 mEq, Oral, Once    Continuous IV Infusions:      PRN Meds:  acetaminophen, 650 mg, Oral, Q4H PRN  ondansetron, 4 mg, Intravenous, Q6H PRN        IP CONSULT TO CASE MANAGEMENT    Network Utilization Review Department  ATTENTION: Please call with any questions or concerns to [phone] and carefully listen to the prompts so that you are directed to the right person  All voicemails are confidential   Rosalinda Kraus all requests for admission clinical reviews, approved or denied determinations and any other requests to dedicated fax number below belonging to the campus where the patient is receiving treatment   List of dedicated fax numbers for the Facilities:  [address] [place] [address] DENIALS (Administrative/Medical Necessity) [phone]   [address] (Maternity/NICU/Pediatrics) [address] [facility] [address]  [address] [place] [address] [place] [address] [address] [place] [address] [phone]

## 2022-04-06 NOTE — DISCHARGE SUMMARY
[facility]  [facility] 1960, 65 y [facility]  male MRN: [id number]  Unit/Bed#: [facility] 713-01 Encounter: [id number]  Primary Care Provider: Latrice Joe MD   Date and time admitted to hospital: 4/5/2022  1:24 PM    * UTI (urinary tract infection)  Assessment & Plan  · Patient presents with complaints of nausea and vomiting  · Evidence of cystitis on urinalysis  · Ceftriaxone 1 g q24 hours x3 days   · Follow-up urine cultures  · Will deescalate antibiotics as appropriate  · Assign to observation    Hyperglycemia  Assessment & Plan  · Patient presents with mild hyperglycemia however initially with an anion gap and elevated beta hydroxybutyrate concerning for possible DKA  · Responded well to IV fluid hydration and insulin in the ED  · Anion gap closed  · Continue home Lantus 20 units daily at bedtime and NovoLog 70/30 TID AC  · Sliding scale insulin while inpatient  · IV fluid hydration  · Carbohydrate controlled diet    Leukocytosis  Assessment & Plan  · Likely secondary to acute cystitis  · IV antibiotics as above  · Trend CBC      Discharging Physician / Practitioner: Yoanna Lopez DO  PCP: Latrice Joe MD  Admission Date:   Admission Orders (From admission, onward)     Ordered        04/05/22 1713  Place in Observation  Once                      Discharge Date: 04/06/22    Medical Problems             Resolved Problems  Date Reviewed: 4/6/2022    None                Consultations During Hospital Stay:  Not applicable    Procedures Performed:  Not applicable    Significant Findings / Test Results:     XR chest portable    Result Date: 4/5/2022  Impression: No acute cardiopulmonary disease  Workstation performed: [id number]     CT abdomen pelvis w contrast    Result Date: 4/5/2022  Impression: 1  Esophageal wall thickening, with mucosal hyperenhancement, and surrounding fluid in the posterior mediastinum, may represent esophagitis    Please note that this is only partially visualized  Recommend chest CT with IV contrast for further evaluation  2   Trace left pleural effusion 3  T11 compression deformity  Workstation performed: [id number]       Incidental Findings:  Not applicable    Test Results Pending at Discharge (will require follow up): Not applicable     Outpatient Tests Requested: Outpatient PCP/endocrinology follow-up    Reason for Admission:  Hyperglycemia/acute cystitis    Hospital Course:     Licha Landis is a 65 y o  male with a past medical history significant for insulin-dependent type 2 diabetes mellitus who presents with complaints of nausea and vomiting  He states that his blood glucose levels have been elevated at home  He has no other complaints at this time  In the ED, blood glucose levels were slightly elevated with a mild anion gap metabolic acidosis which resolved with IV fluids and insulin  Urinalysis remarkable for cystitis  The patient was assigned to observation for management of acute cystitis complicated by hyperglycemia        The patient's leukocytosis improved from 18-11  Blood glucose levels within normal limits this morning  The patient states he feels much improved  He was strongly encouraged to resume his outpatient insulin regimen  A prescription for Augmentin x5 days was sent to the patient's pharmacy in the setting of acute cystitis  He was strongly encouraged to take this medication as prescribed  He was also encouraged to follow-up with his PCP and endocrinologist within 7-14 days  His sister will provide transportation home  He was discharged in stable condition on 04/06/2022  Condition at Discharge: stable     Discharge Day Visit / Exam:     Subjective: Patient seen and examined at bedside  No acute events overnight  Denies chest pain, SOB, diaphoresis, nausea/vomiting/diarrhea, fevers/chills       Vitals: Blood Pressure: 151/80 (04/06/22 0722)  Pulse: 87 (04/06/22 0722)  Temperature: 97 6 °F (36 4 °C) (04/06/22 8518)  Temp Source: Temporal (04/06/22 0722)  Respirations: 18 (04/06/22 0722)  Height: 5' 11" (180 3 cm) (04/05/22 1751)  Weight - Scale: 71 kg (156 lb 8 4 oz) (04/05/22 1321)  SpO2: 96 % (04/06/22 0722)     Exam:   Physical Exam  Vitals and nursing note reviewed  Constitutional:       Appearance: He is well-developed  HENT:      Head: Normocephalic and atraumatic  Eyes:      Conjunctiva/sclera: Conjunctivae normal    Cardiovascular:      Rate and Rhythm: Normal rate and regular rhythm  Heart sounds: No murmur heard  Pulmonary:      Effort: Pulmonary effort is normal  No respiratory distress  Breath sounds: Normal breath sounds  Abdominal:      Palpations: Abdomen is soft  Tenderness: There is no abdominal tenderness  Musculoskeletal:      Cervical back: Neck supple  Skin:     General: Skin is warm and dry  Neurological:      Mental Status: He is alert  Discharge instructions/Information to patient and family:   See after visit summary for information provided to patient and family  Provisions for Follow-Up Care:  See after visit summary for information related to follow-up care and any pertinent home health orders  Disposition:     Home with family support  Outpatient PCP and endocrinology follow-up  Augmentin x5 days in the setting of acute cystitis     Discharge Statement:  I spent 60 minutes discharging the patient  This time was spent on the day of discharge  I had direct contact with the patient on the day of discharge  Greater than 50% of the total time was spent examining patient, answering all patient questions, arranging and discussing plan of care with patient as well as directly providing post-discharge instructions  Additional time then spent on discharge activities  Discharge Medications:  See after visit summary for reconciled discharge medications provided to patient and family        ** Please Note: This note has been constructed using a voice recognition system **

## 2022-04-06 NOTE — INCIDENTAL FINDINGS
The following findings require follow up:  Radiographic finding   Findin  Esophageal wall thickening, with mucosal hyperenhancement, and surrounding fluid in the posterior mediastinum, may represent esophagitis  Please note that this is only partially visualized  Recommend chest CT with IV contrast for further   evaluation  2   Trace left pleural effusion  3  T11 compression deformity     Follow up required:  Outpatient PCP follow-up   Follow up should be done within 7-14 days    Please notify the following clinician to assist with the follow up:   PCP

## 2022-04-06 NOTE — PLAN OF CARE
Problem: Potential for Falls  Goal: Patient will remain free of falls  Description: INTERVENTIONS:  - Educate patient/family on patient safety including physical limitations  - Instruct patient to call for assistance with activity   - Consult OT/PT to assist with strengthening/mobility   - Keep Call bell within reach  - Keep bed low and locked with side rails adjusted as appropriate  - Keep care items and personal belongings within reach  - Initiate and maintain comfort rounds  - Make Fall Risk Sign visible to staff    - Apply yellow socks and bracelet for high fall risk patients  - Consider moving patient to room near nurses station  Outcome: Progressing     Problem: INFECTION - ADULT  Goal: Absence or prevention of progression during hospitalization  Description: INTERVENTIONS:  - Assess and monitor for signs and symptoms of infection  - Monitor lab/diagnostic results  - Monitor all insertion sites, i e  indwelling lines, tubes, and drains  - Monitor endotracheal if appropriate and nasal secretions for changes in amount and color  - [place] appropriate cooling/warming therapies per order  - Administer medications as ordered  - Instruct and encourage patient and family to use good hand hygiene technique  - Identify and instruct in appropriate isolation precautions for identified infection/condition  Outcome: Progressing     Problem: SAFETY ADULT  Goal: Patient will remain free of falls  Description: INTERVENTIONS:  - Educate patient/family on patient safety including physical limitations  - Instruct patient to call for assistance with activity   - Consult OT/PT to assist with strengthening/mobility   - Keep Call bell within reach  - Keep bed low and locked with side rails adjusted as appropriate  - Keep care items and personal belongings within reach  - Initiate and maintain comfort rounds  - Make Fall Risk Sign visible to staff    - Apply yellow socks and bracelet for high fall risk patients  - Consider moving patient to room near nurses station  Outcome: Progressing     Problem: DISCHARGE PLANNING  Goal: Discharge to home or other facility with appropriate resources  Description: INTERVENTIONS:  - Identify barriers to discharge w/patient and caregiver  - Arrange for needed discharge resources and transportation as appropriate  - Identify discharge learning needs (meds, wound care, etc )  - Arrange for interpretive services to assist at discharge as needed  - Refer to Case Management Department for coordinating discharge planning if the patient needs post-hospital services based on physician/advanced practitioner order or complex needs related to functional status, cognitive ability, or social support system  Outcome: Progressing     Problem: MOBILITY - ADULT  Goal: Maintain or return to baseline ADL function  Description: INTERVENTIONS:  -  Assess patient's ability to carry out ADLs; assess patient's baseline for ADL function and identify physical deficits which impact ability to perform ADLs (bathing, care of mouth/teeth, toileting, grooming, dressing, etc )  - Assess/evaluate cause of self-care deficits   - Assess range of motion  - Assess patient's mobility; develop plan if impaired  - Assess patient's need for assistive devices and provide as appropriate  - Encourage maximum independence but intervene and supervise when necessary  - Involve family in performance of ADLs  - Assess for home care needs following discharge   - Consider OT consult to assist with ADL evaluation and planning for discharge  - Provide patient education as appropriate  Outcome: Progressing  Goal: Maintains/Returns to pre admission functional level  Description: INTERVENTIONS:  - Perform BMAT or MOVE assessment daily    - Set and communicate daily mobility goal to care team and patient/family/caregiver     - Collaborate with rehabilitation services on mobility goals if consulted    - Ambulate patient 4 times a day    - Out of bed for toileting  - Record patient progress and toleration of activity level   Outcome: Progressing     Problem: Nutrition/Hydration-ADULT  Goal: Nutrient/Hydration intake appropriate for improving, restoring or maintaining nutritional needs  Description: Monitor and assess patient's nutrition/hydration status for malnutrition  Collaborate with interdisciplinary team and initiate plan and interventions as ordered  Monitor patient's weight and dietary intake as ordered or per policy  Utilize nutrition screening tool and intervene as necessary  Determine patient's food preferences and provide high-protein, high-caloric foods as appropriate       INTERVENTIONS:  - Monitor oral intake, urinary output, labs, and treatment plans  - Assess nutrition and hydration status and recommend course of action  - Evaluate amount of meals eaten  - Assist patient with eating if necessary   - Allow adequate time for meals  - Recommend/ encourage appropriate diets, oral nutritional supplements, and vitamin/mineral supplements  - Order, calculate, and assess calorie counts as needed  - Recommend, monitor, and adjust tube feedings and TPN/PPN based on assessed needs  - Assess need for intravenous fluids  - Provide specific nutrition/hydration education as appropriate  - Include patient/family/caregiver in decisions related to nutrition  Outcome: Progressing

## 2022-05-24 ENCOUNTER — HOSPITAL ENCOUNTER (EMERGENCY)
Facility: HOSPITAL | Age: 62
Discharge: HOME | End: 2022-05-24
Attending: EMERGENCY MEDICINE | Admitting: EMERGENCY MEDICINE
Payer: COMMERCIAL

## 2022-05-24 VITALS
HEART RATE: 88 BPM | SYSTOLIC BLOOD PRESSURE: 146 MMHG | OXYGEN SATURATION: 100 % | RESPIRATION RATE: 16 BRPM | BODY MASS INDEX: 23.52 KG/M2 | TEMPERATURE: 97.9 F | DIASTOLIC BLOOD PRESSURE: 80 MMHG | WEIGHT: 168.65 LBS

## 2022-05-24 DIAGNOSIS — E16.0 HYPOGLYCEMIA DUE TO INSULIN: Primary | ICD-10-CM

## 2022-05-24 DIAGNOSIS — T38.3X5A HYPOGLYCEMIA DUE TO INSULIN: Primary | ICD-10-CM

## 2022-05-24 LAB
GLUCOSE SERPL-MCNC: 133 MG/DL (ref 65–140)
GLUCOSE SERPL-MCNC: 154 MG/DL (ref 65–140)

## 2022-05-24 PROCEDURE — 99285 EMERGENCY DEPT VISIT HI MDM: CPT

## 2022-05-24 PROCEDURE — 82948 REAGENT STRIP/BLOOD GLUCOSE: CPT

## 2022-05-24 PROCEDURE — 99282 EMERGENCY DEPT VISIT SF MDM: CPT | Performed by: EMERGENCY MEDICINE

## 2022-05-24 NOTE — ED PROVIDER NOTES
History  Chief Complaint   Patient presents with    Hypoglycemia - Symptomatic     Brought in via EMS s/p being found by [employer] on all fours with altered mental status  Hx of diabetes; BG 51 upon EMS arrival  Glucose given via IV  Glucose now stable  Pt reports taking 10 units insulin at 8am but has not eaten since last evening  64 yo male with a history of DM brought to the ED by EMS for evaluation of hypoglycemia  [employer] delivered food to the patient this afternoon and found him acting strangely, ambulating on his hands and knees  The delivery person called 9-1-1 --> when medics arrived finger stick glucose was 51  EMS administered IV 50% dextrose (25 ml) at the scene with immediate return to mental status baseline  The patient says he feels "fine" on arrival to the ED  Finger stick was 133 on arrival  Upon further questioning, the patient says he took his usual dose of insulin this morning but did not eat breakfast because "I didn't feel like it"  No history of falls or recent head injury  He denies chest pain, shortness of breath, nausea, and vomiting  No other specific complaints  Prior to Admission Medications   Prescriptions Last Dose Informant Patient Reported? Taking? Continuous Blood Gluc  (FreeStyle [place] 14 Day [place]) DUNIA   No No   Sig: Use 1 each 4 (four) times a day   Continuous Blood Gluc Sensor (FreeStyle [place] 14 Day Sensor) MISC   No No   Sig: Use 1 each 4 (four) times a day   Lancets (freestyle) lancets   No No   Sig: Use as instructed   ammonium lactate (LAC-HYDRIN) 12 % lotion   No No   Sig: Apply to dry skin twice daily as needed or when changing dressings     amoxicillin (AMOXIL) 500 MG tablet   Yes No   Sig: take 2 tablets by mouth IN THE MORNING AND AT NIGHT   glucose blood (FREESTYLE [place]) test strip   No No   Sig: Use as instructed   glucose monitoring kit (FREESTYLE) monitoring kit   No No   Sig: Use 1 each 2 (two) times a day   insulin glargine (LANTUS) 100 units/mL subcutaneous injection   No No   Sig: Inject 24 Units under the skin daily at bedtime   insulin lispro (HumaLOG) 100 units/mL injection   No No   Sig: Inject 10 Units under the skin 3 (three) times a day with meals   insulin lispro (HumaLOG) 100 units/mL injection pen   Yes No   Sig: Inject 10 Units under the skin   mupirocin (BACTROBAN) 2 % ointment   No No   Sig: Apply topically daily      Facility-Administered Medications: None       Past Medical History:   Diagnosis Date    Diabetes mellitus ([facility] [address] )     Diarrhea        No past surgical history on file  Family History   Problem Relation Age of Onset    Diabetes Mother     Cancer Mother     Thyroid disease Mother     Diabetes Father     Cancer Father     Hypertension Sister      I have reviewed and agree with the history as documented  E-Cigarette/Vaping    E-Cigarette Use Never User      E-Cigarette/Vaping Substances    Nicotine No     THC No     CBD No     Flavoring No     Other No     Unknown No      Social History     Tobacco Use    Smoking status: Never Smoker    Smokeless tobacco: Never Used   Vaping Use    Vaping Use: Never used   Substance Use Topics    Alcohol use: Never    Drug use: Never       Review of Systems   Constitutional: Negative for chills and fever  HENT: Negative for sore throat  Respiratory: Negative for cough and shortness of breath  Cardiovascular: Negative for chest pain and palpitations  Gastrointestinal: Negative for abdominal pain, diarrhea, nausea and vomiting  Endocrine: Negative for cold intolerance and heat intolerance  Genitourinary: Negative for dysuria and flank pain  Musculoskeletal: Negative for back pain  Skin: Negative for rash  Allergic/Immunologic: Negative for immunocompromised state  Neurological: Negative for weakness, numbness and headaches  Hematological: Negative for adenopathy  Psychiatric/Behavioral: The patient is not nervous/anxious  Physical Exam  Physical Exam  Constitutional:       General: He is not in acute distress  Appearance: He is well-developed  HENT:      Head: Normocephalic and atraumatic  Eyes:      Pupils: Pupils are equal, round, and reactive to light  Cardiovascular:      Rate and Rhythm: Normal rate and regular rhythm  Pulmonary:      Effort: Pulmonary effort is normal  No respiratory distress  Breath sounds: Normal breath sounds  Abdominal:      General: There is no distension  Palpations: Abdomen is soft  Tenderness: There is no abdominal tenderness  Musculoskeletal:         General: Normal range of motion  Cervical back: Normal range of motion and neck supple  Skin:     General: Skin is warm and dry  Neurological:      General: No focal deficit present  Mental Status: He is alert and oriented to person, place, and time  Cranial Nerves: Cranial nerves are intact  Sensory: Sensation is intact  Motor: Motor function is intact  Coordination: Coordination is intact  Gait: Gait is intact  Deep Tendon Reflexes: Reflexes are normal and symmetric           Vital Signs  ED Triage Vitals [05/24/22 1154]   Temperature Pulse Respirations Blood Pressure SpO2   97 9 °F (36 6 °C) 86 16 148/81 100 %      Temp Source Heart Rate Source Patient Position - Orthostatic VS BP Location FiO2 (%)   Tympanic Monitor Sitting Left arm --      Pain Score       --           Vitals:    05/24/22 1154 05/24/22 1346   BP: 148/81 146/80   Pulse: 86 88   Patient Position - Orthostatic VS: Sitting Sitting         Visual Acuity      ED Medications  Medications - No data to display    Diagnostic Studies  Results Reviewed     Procedure Component Value Units Date/Time    Fingerstick Glucose (POCT) [[id number]]  (Abnormal) Collected: 05/24/22 1344    Lab Status: Final result Updated: 05/24/22 1347     POC Glucose 154 mg/dl     Fingerstick Glucose (POCT) [[id number]]  (Normal) Collected: 05/24/22 1148    Lab Status: Final result Updated: 05/24/22 1210     POC Glucose 133 mg/dl                  No orders to display              Procedures  Procedures         ED Course                               SBIRT 20yo+    Flowsheet Row Most Recent Value   SBIRT (25 yo +)    In order to provide better care to our patients, we are screening all of our patients for alcohol and drug use  Would it be okay to ask you these screening questions? Yes Filed at: 05/24/2022 1158   Initial Alcohol Screen: US AUDIT-C     1  How often do you have a drink containing alcohol? 0 Filed at: 05/24/2022 1158   2  How many drinks containing alcohol do you have on a typical day you are drinking? 0 Filed at: 05/24/2022 1158   3a  Male UNDER 65: How often do you have five or more drinks on one occasion? 0 Filed at: 05/24/2022 1158   Audit-C Score 0 Filed at: 05/24/2022 1158   JASON: How many times in the past year have you    Used an illegal drug or used a prescription medication for non-medical reasons? Never Filed at: 05/24/2022 1158                    MDM  Number of Diagnoses or Management Options  Hypoglycemia due to insulin  Diagnosis management comments: The patient is very well appearing with stable vital signs and a benign exam  He has no current complaints  Earlier episode of hypoglycemia likely secondary to skipping breakfast this morning  The patient was provided with food  Will monitor in the ED for 1-2 hours then reassess  13:47 Glucose is 154  The patient is still entirely asymptomatic and requesting discharge  Sister at bedside  Will discharge to home per patient request  Plan for follow up with his PCP later this week for reassessment  The patient is agreeable to this plan  Strict return precautions provided         Amount and/or Complexity of Data Reviewed  Clinical lab tests: ordered and reviewed    Patient Progress  Patient progress: improved      Disposition  Final diagnoses:   Hypoglycemia due to insulin Time reflects when diagnosis was documented in both MDM as applicable and the Disposition within this note     Time User Action Codes Description Comment    5/24/2022  1:47 PM Sveta Carmona Add [E16 0,  T38 [id number]] Hypoglycemia due to insulin       ED Disposition     ED Disposition   Discharge    Condition   Stable    Date/Time   Tue May 24, 2022  1:47 PM    Comment   Beverley Castillo discharge to home/self care  Follow-up Information     Follow up With Specialties Details Why Contact Marin Vogel MD Family Medicine Schedule an appointment as soon as possible for a visit   [address] [place] [address]  [phone]            Patient's Medications   Discharge Prescriptions    No medications on file       No discharge procedures on file      PDMP Review     None          ED Provider  Electronically Signed by           Augusto Carlson MD  05/24/22 5693

## 2022-05-24 NOTE — ED NOTES
Blood Sugar 133 upon arrival       Fatemeh Espino, [address] [place] [address]  05/24/22 8495
Pt ate sandwich, chips, pretzels, and drank water   Pt states he feels fine since arrival       Marlys Kapadia RN  05/24/22 0846
93